# Patient Record
Sex: MALE | Race: WHITE | NOT HISPANIC OR LATINO | Employment: FULL TIME | URBAN - METROPOLITAN AREA
[De-identification: names, ages, dates, MRNs, and addresses within clinical notes are randomized per-mention and may not be internally consistent; named-entity substitution may affect disease eponyms.]

---

## 2021-03-31 ENCOUNTER — NURSE TRIAGE (OUTPATIENT)
Dept: OTHER | Facility: OTHER | Age: 51
End: 2021-03-31

## 2021-03-31 DIAGNOSIS — Z20.822 SUSPECTED SEVERE ACUTE RESPIRATORY SYNDROME CORONAVIRUS 2 (SARS-COV-2) INFECTION: ICD-10-CM

## 2021-03-31 DIAGNOSIS — Z20.822 SUSPECTED SEVERE ACUTE RESPIRATORY SYNDROME CORONAVIRUS 2 (SARS-COV-2) INFECTION: Primary | ICD-10-CM

## 2021-03-31 PROCEDURE — U0005 INFEC AGEN DETEC AMPLI PROBE: HCPCS | Performed by: FAMILY MEDICINE

## 2021-03-31 PROCEDURE — U0003 INFECTIOUS AGENT DETECTION BY NUCLEIC ACID (DNA OR RNA); SEVERE ACUTE RESPIRATORY SYNDROME CORONAVIRUS 2 (SARS-COV-2) (CORONAVIRUS DISEASE [COVID-19]), AMPLIFIED PROBE TECHNIQUE, MAKING USE OF HIGH THROUGHPUT TECHNOLOGIES AS DESCRIBED BY CMS-2020-01-R: HCPCS | Performed by: FAMILY MEDICINE

## 2021-03-31 NOTE — TELEPHONE ENCOUNTER
1  Were you within 6 feet or less, for up to 15 minutes or more with a person that has a confirmed COVID-19 test? Blaire Mclean tested positive 3/28/2021   2  What was the date of your exposure? Works closely with him everyday  Last date of close contact was 3/26/2021    3  Are you experiencing any symptoms attributed to the virus?  (Assess for SOB, cough, fever, difficulty breathing)   Started 3/30/2021  Runny nose  Watery eyes  Intermittent, productive cough  Sputum is clear  He was coughing a lot earlier this morning  Body aches  4   HIGH RISK: Do you have any history heart or lung conditions, weakened immune system, diabetes, Asthma, CHF, HIV, COPD, Chemo, renal failure, sickle cell, etc?    Smoker

## 2021-03-31 NOTE — TELEPHONE ENCOUNTER
Reason for Disposition   [1] COVID-19 infection suspected by caller or triager AND [2] mild symptoms (cough, fever, or others) AND [0] no complications or SOB    Protocols used: CORONAVIRUS (COVID-19) DIAGNOSED OR SUSPECTED-ADULT-OH

## 2021-03-31 NOTE — TELEPHONE ENCOUNTER
Regarding: SYMPTOMATIC - RUNNY NOSE/COUGH - COVID TEST REQUEST  ----- Message from Rashel Gracia sent at 3/31/2021 10:17 AM EDT -----  "I need to be tested due to my boss has tested positive and now I am now symptoms runny nose, cough, eyes watery  "

## 2021-04-01 LAB — SARS-COV-2 RNA RESP QL NAA+PROBE: NEGATIVE

## 2022-12-14 ENCOUNTER — OFFICE VISIT (OUTPATIENT)
Dept: FAMILY MEDICINE CLINIC | Facility: CLINIC | Age: 52
End: 2022-12-14

## 2022-12-14 VITALS
WEIGHT: 188 LBS | HEIGHT: 68 IN | RESPIRATION RATE: 16 BRPM | DIASTOLIC BLOOD PRESSURE: 80 MMHG | TEMPERATURE: 97.1 F | OXYGEN SATURATION: 99 % | SYSTOLIC BLOOD PRESSURE: 120 MMHG | BODY MASS INDEX: 28.49 KG/M2 | HEART RATE: 63 BPM

## 2022-12-14 DIAGNOSIS — F41.9 ANXIETY: ICD-10-CM

## 2022-12-14 DIAGNOSIS — F33.41 RECURRENT MAJOR DEPRESSIVE DISORDER, IN PARTIAL REMISSION (HCC): ICD-10-CM

## 2022-12-14 DIAGNOSIS — F10.20 UNCOMPLICATED ALCOHOL DEPENDENCE (HCC): Primary | ICD-10-CM

## 2022-12-14 DIAGNOSIS — F17.200 NICOTINE USE DISORDER: ICD-10-CM

## 2022-12-14 RX ORDER — BUSPIRONE HYDROCHLORIDE 15 MG/1
15 TABLET ORAL 2 TIMES DAILY
COMMUNITY
End: 2022-12-14 | Stop reason: SDUPTHER

## 2022-12-14 RX ORDER — NALTREXONE 380 MG
KIT INTRAMUSCULAR
COMMUNITY
Start: 2022-12-12

## 2022-12-14 RX ORDER — PHENOL 1.4 %
AEROSOL, SPRAY (ML) MUCOUS MEMBRANE
COMMUNITY

## 2022-12-14 RX ORDER — BUSPIRONE HYDROCHLORIDE 15 MG/1
15 TABLET ORAL 2 TIMES DAILY
Qty: 180 TABLET | Refills: 3 | Status: SHIPPED | OUTPATIENT
Start: 2022-12-14 | End: 2022-12-22 | Stop reason: SDUPTHER

## 2022-12-14 RX ORDER — ESCITALOPRAM OXALATE 10 MG/1
10 TABLET ORAL DAILY
COMMUNITY
End: 2022-12-14 | Stop reason: SDUPTHER

## 2022-12-14 RX ORDER — ESCITALOPRAM OXALATE 10 MG/1
10 TABLET ORAL DAILY
Qty: 90 TABLET | Refills: 3 | Status: SHIPPED | OUTPATIENT
Start: 2022-12-14 | End: 2022-12-22 | Stop reason: SDUPTHER

## 2022-12-14 RX ORDER — TRAZODONE HYDROCHLORIDE 50 MG/1
TABLET ORAL
Qty: 60 TABLET | Refills: 5 | Status: SHIPPED | OUTPATIENT
Start: 2022-12-14 | End: 2022-12-22 | Stop reason: SDUPTHER

## 2022-12-14 NOTE — PROGRESS NOTES
Chief Complaint   Patient presents with   • Establish Care     Recent rehab        HPI   29-year-old male presents as new patient  Has history significant for alcoholism  Just completed a 3-week rehab at [facility]  Has been in rehab in 2015 but was not quite paying attention  90-day program at that time  Lamar Barahonauel His alcohol of choice  He was discharged on Vivitrol injections  Also treated for anxiety/depression with Lexapro 10 mg daily and buspirone 15 mg twice daily  Past history occludes tobacco use about 1 pack a day  History is negative for hypertension, heart disease, diabetes, stroke, and cancer  No allergies to medications  Notes that he has difficulty sleeping at night  Mind is off to the races  Also has difficulty with focus  Has been using nicotine lozenges which seems to help for the craving for tobacco   Currently smoking 1 or 2 cigarettes a day and only taking a couple puffs  First rehab was prompted by [facility] multiple times in the prospect of going to group home  Current rehab precipitated by drinking on the job and losing his job and his girlfriend having enough     Going to NA meetings  Gets more out of them than AA meetings  Trying to go daily  Will be going to [address] for outpatient follow-up  Should be getting his Vivitrol through hand  Discharged from rehab 5 days ago  Past Medical History:   Diagnosis Date   • Alcohol addiction ([facility] ) 12/14/2015   • Anxiety 12/14/2022   • Nicotine use disorder 12/14/2022   • Recurrent major depressive disorder, in partial remission ([facility] ) 12/14/2022        History reviewed  No pertinent surgical history  Social History     Tobacco Use   • Smoking status: Every Day     Types: Cigarettes   • Smokeless tobacco: Never   Substance Use Topics   • Alcohol use: Not Currently       Social History     Social History Narrative    With Patti since 2003  5-year marriage previously  2 children from first marriage  Is a       Patti is a nurse at [place] [address]  Enjoys boating  The following portions of the patient's history were reviewed and updated as appropriate: allergies, current medications, past family history, past medical history, past social history, past surgical history and problem list       Review of Systems       /80   Pulse 63   Temp (!) 97 1 °F (36 2 °C) (Temporal)   Resp 16   Ht 5' 8 11" (1 73 m)   Wt 85 3 kg (188 lb)   SpO2 99%   BMI 28 49 kg/m²      Physical Exam   Pleasant male who appears well  Repeat blood pressure 144/90  Lungs are clear  Heart regular  Mood appears to be okay  Affect appropriate  Current Outpatient Medications:   •  Melatonin 10 MG TABS, Take by mouth daily at bedtime, Disp: , Rfl:   •  Vivitrol 380 MG SUSR, , Disp: , Rfl:   •  busPIRone (BUSPAR) 15 mg tablet, Take 1 tablet (15 mg total) by mouth 2 (two) times a day, Disp: 180 tablet, Rfl: 3  •  escitalopram (LEXAPRO) 10 mg tablet, Take 1 tablet (10 mg total) by mouth daily, Disp: 90 tablet, Rfl: 3  •  nicotine polacrilex (NICORETTE) 4 mg gum, Chew 1 each (4 mg total) as needed for smoking cessation, Disp: 100 each, Rfl: 5  •  traZODone (DESYREL) 50 mg tablet, One-2 tablets at bedtime as needed for sleep, Disp: 60 tablet, Rfl: 5     No problem-specific Assessment & Plan notes found for this encounter  Diagnoses and all orders for this visit:    Uncomplicated alcohol dependence ([facility] )    Anxiety  -     busPIRone (BUSPAR) 15 mg tablet; Take 1 tablet (15 mg total) by mouth 2 (two) times a day    Recurrent major depressive disorder, in partial remission (HCC)  -     escitalopram (LEXAPRO) 10 mg tablet; Take 1 tablet (10 mg total) by mouth daily  -     traZODone (DESYREL) 50 mg tablet; One-2 tablets at bedtime as needed for sleep    Nicotine use disorder  -     nicotine polacrilex (NICORETTE) 4 mg gum;  Chew 1 each (4 mg total) as needed for smoking cessation    Other orders  -     Vivitrol 380 MG SUSR  - Discontinue: escitalopram (LEXAPRO) 10 mg tablet; Take 10 mg by mouth daily  -     Discontinue: busPIRone (BUSPAR) 15 mg tablet; Take 15 mg by mouth 2 (two) times a day  -     Melatonin 10 MG TABS; Take by mouth daily at bedtime  -     Discontinue: nicotine polacrilex (NICORETTE) 4 mg gum; Chew 4 mg as needed for smoking cessation        Patient Instructions   Review of his extensive addictive history and doing great so far-every for 5 days  Continue Lexapro which is used for anxiety and depression and buspirone 15 mg twice daily for anxiety  Will be getting Vivitrol through the outpatient alcohol clinic  Switch to Nicorette gum as he appears to be doing great is cutting back on his use of tobacco   Trial of trazodone 50 mg, 1 or 2 tablets at bedtime to help with sleep  Will consider treating his difficulty with focus and follow-up visit  Also completed physical exam at that time and get some blood work to check liver function test and cholesterol  Recheck in 1 month

## 2022-12-14 NOTE — PATIENT INSTRUCTIONS
Review of his extensive addictive history and doing great so far-every for 5 days  Continue Lexapro which is used for anxiety and depression and buspirone 15 mg twice daily for anxiety  Will be getting Vivitrol through the outpatient alcohol clinic  Switch to Nicorette gum as he appears to be doing great is cutting back on his use of tobacco   Trial of trazodone 50 mg, 1 or 2 tablets at bedtime to help with sleep  Will consider treating his difficulty with focus and follow-up visit  Also completed physical exam at that time and get some blood work to check liver function test and cholesterol  Recheck in 1 month

## 2022-12-21 ENCOUNTER — TELEPHONE (OUTPATIENT)
Dept: FAMILY MEDICINE CLINIC | Facility: CLINIC | Age: 52
End: 2022-12-21

## 2022-12-21 DIAGNOSIS — F17.200 NICOTINE USE DISORDER: ICD-10-CM

## 2022-12-21 DIAGNOSIS — F33.41 RECURRENT MAJOR DEPRESSIVE DISORDER, IN PARTIAL REMISSION (HCC): ICD-10-CM

## 2022-12-21 DIAGNOSIS — F41.9 ANXIETY: ICD-10-CM

## 2022-12-21 NOTE — TELEPHONE ENCOUNTER
Patient called and stated that he does not have his insurance card yet and he is using [facility] and his medications are all cheaper at [employer] in [place]  Can you please resend all of the ones you prescribed to [facility] aid?   I already cancelled them through [facility]   Please call patient to advise

## 2022-12-22 RX ORDER — BUSPIRONE HYDROCHLORIDE 15 MG/1
15 TABLET ORAL 2 TIMES DAILY
Qty: 180 TABLET | Refills: 3 | Status: SHIPPED | OUTPATIENT
Start: 2022-12-22

## 2022-12-22 RX ORDER — ESCITALOPRAM OXALATE 10 MG/1
10 TABLET ORAL DAILY
Qty: 90 TABLET | Refills: 3 | Status: SHIPPED | OUTPATIENT
Start: 2022-12-22

## 2022-12-22 RX ORDER — TRAZODONE HYDROCHLORIDE 50 MG/1
TABLET ORAL
Qty: 60 TABLET | Refills: 5 | Status: SHIPPED | OUTPATIENT
Start: 2022-12-22

## 2023-04-04 ENCOUNTER — OFFICE VISIT (OUTPATIENT)
Dept: FAMILY MEDICINE CLINIC | Facility: CLINIC | Age: 53
End: 2023-04-04

## 2023-04-04 ENCOUNTER — HOSPITAL ENCOUNTER (INPATIENT)
Facility: HOSPITAL | Age: 53
LOS: 2 days | Discharge: HOME | End: 2023-04-06
Attending: EMERGENCY MEDICINE | Admitting: EMERGENCY MEDICINE

## 2023-04-04 VITALS
HEART RATE: 121 BPM | BODY MASS INDEX: 29.86 KG/M2 | OXYGEN SATURATION: 97 % | SYSTOLIC BLOOD PRESSURE: 141 MMHG | DIASTOLIC BLOOD PRESSURE: 76 MMHG | HEIGHT: 68 IN | RESPIRATION RATE: 16 BRPM | TEMPERATURE: 97.3 F | WEIGHT: 197 LBS

## 2023-04-04 DIAGNOSIS — F10.20 UNCOMPLICATED ALCOHOL DEPENDENCE (HCC): Primary | ICD-10-CM

## 2023-04-04 DIAGNOSIS — F33.41 RECURRENT MAJOR DEPRESSIVE DISORDER, IN PARTIAL REMISSION (HCC): ICD-10-CM

## 2023-04-04 DIAGNOSIS — F41.9 ANXIETY: ICD-10-CM

## 2023-04-04 DIAGNOSIS — F10.20 ALCOHOL USE DISORDER, SEVERE, DEPENDENCE (HCC): ICD-10-CM

## 2023-04-04 DIAGNOSIS — F10.10 ALCOHOL ABUSE: Primary | ICD-10-CM

## 2023-04-04 DIAGNOSIS — Z87.898 H/O WHEEZING: ICD-10-CM

## 2023-04-04 PROBLEM — F10.939 ALCOHOL WITHDRAWAL SYNDROME WITH COMPLICATION, WITH UNSPECIFIED COMPLICATION (HCC): Status: ACTIVE | Noted: 2023-04-04

## 2023-04-04 PROBLEM — R79.89 ELEVATED LFTS: Status: ACTIVE | Noted: 2023-04-04

## 2023-04-04 PROBLEM — R74.8 ELEVATED LIPASE: Status: ACTIVE | Noted: 2023-04-04

## 2023-04-04 PROBLEM — R22.2 NODULE OF CHEST WALL: Status: ACTIVE | Noted: 2023-04-04

## 2023-04-04 LAB
ALBUMIN SERPL BCP-MCNC: 4.8 G/DL (ref 3.5–5)
ALP SERPL-CCNC: 79 U/L (ref 34–104)
ALT SERPL W P-5'-P-CCNC: 45 U/L (ref 7–52)
AMPHETAMINES SERPL QL SCN: NEGATIVE
ANION GAP SERPL CALCULATED.3IONS-SCNC: 18 MMOL/L (ref 4–13)
AST SERPL W P-5'-P-CCNC: 122 U/L (ref 13–39)
ATRIAL RATE: 111 BPM
BARBITURATES UR QL: NEGATIVE
BASOPHILS # BLD AUTO: 0.07 THOUSANDS/ÂΜL (ref 0–0.1)
BASOPHILS NFR BLD AUTO: 1 % (ref 0–1)
BENZODIAZ UR QL: NEGATIVE
BILIRUB SERPL-MCNC: 0.67 MG/DL (ref 0.2–1)
BUN SERPL-MCNC: 13 MG/DL (ref 5–25)
CALCIUM SERPL-MCNC: 9.7 MG/DL (ref 8.4–10.2)
CHLORIDE SERPL-SCNC: 101 MMOL/L (ref 96–108)
CO2 SERPL-SCNC: 23 MMOL/L (ref 21–32)
COCAINE UR QL: NEGATIVE
CREAT SERPL-MCNC: 0.83 MG/DL (ref 0.6–1.3)
EOSINOPHIL # BLD AUTO: 0.07 THOUSAND/ÂΜL (ref 0–0.61)
EOSINOPHIL NFR BLD AUTO: 1 % (ref 0–6)
ERYTHROCYTE [DISTWIDTH] IN BLOOD BY AUTOMATED COUNT: 14 % (ref 11.6–15.1)
ETHANOL EXG-MCNC: 0.24 MG/DL
GFR SERPL CREATININE-BSD FRML MDRD: 100 ML/MIN/1.73SQ M
GLUCOSE SERPL-MCNC: 116 MG/DL (ref 65–140)
HCT VFR BLD AUTO: 43.7 % (ref 36.5–49.3)
HGB BLD-MCNC: 15.2 G/DL (ref 12–17)
IMM GRANULOCYTES # BLD AUTO: 0.03 THOUSAND/UL (ref 0–0.2)
IMM GRANULOCYTES NFR BLD AUTO: 0 % (ref 0–2)
LIPASE SERPL-CCNC: 95 U/L (ref 11–82)
LYMPHOCYTES # BLD AUTO: 2.17 THOUSANDS/ÂΜL (ref 0.6–4.47)
LYMPHOCYTES NFR BLD AUTO: 26 % (ref 14–44)
MAGNESIUM SERPL-MCNC: 2.1 MG/DL (ref 1.9–2.7)
MCH RBC QN AUTO: 33.2 PG (ref 26.8–34.3)
MCHC RBC AUTO-ENTMCNC: 34.8 G/DL (ref 31.4–37.4)
MCV RBC AUTO: 95 FL (ref 82–98)
METHADONE UR QL: NEGATIVE
MONOCYTES # BLD AUTO: 1.09 THOUSAND/ÂΜL (ref 0.17–1.22)
MONOCYTES NFR BLD AUTO: 13 % (ref 4–12)
NEUTROPHILS # BLD AUTO: 4.8 THOUSANDS/ÂΜL (ref 1.85–7.62)
NEUTS SEG NFR BLD AUTO: 59 % (ref 43–75)
NRBC BLD AUTO-RTO: 0 /100 WBCS
OPIATES UR QL SCN: NEGATIVE
OXYCODONE+OXYMORPHONE UR QL SCN: NEGATIVE
P AXIS: 38 DEGREES
PCP UR QL: NEGATIVE
PLATELET # BLD AUTO: 219 THOUSANDS/UL (ref 149–390)
PMV BLD AUTO: 8.7 FL (ref 8.9–12.7)
POTASSIUM SERPL-SCNC: 3.5 MMOL/L (ref 3.5–5.3)
PR INTERVAL: 140 MS
PROT SERPL-MCNC: 7 G/DL (ref 6.4–8.4)
QRS AXIS: 23 DEGREES
QRSD INTERVAL: 78 MS
QT INTERVAL: 314 MS
QTC INTERVAL: 427 MS
RBC # BLD AUTO: 4.58 MILLION/UL (ref 3.88–5.62)
SODIUM SERPL-SCNC: 142 MMOL/L (ref 135–147)
T WAVE AXIS: 45 DEGREES
THC UR QL: NEGATIVE
VENTRICULAR RATE: 111 BPM
WBC # BLD AUTO: 8.23 THOUSAND/UL (ref 4.31–10.16)

## 2023-04-04 RX ORDER — PHENOBARBITAL SODIUM 130 MG/ML
130 INJECTION INTRAMUSCULAR ONCE
Status: COMPLETED | OUTPATIENT
Start: 2023-04-04 | End: 2023-04-04

## 2023-04-04 RX ORDER — ALBUTEROL SULFATE 90 UG/1
2 AEROSOL, METERED RESPIRATORY (INHALATION) EVERY 4 HOURS PRN
Status: DISCONTINUED | OUTPATIENT
Start: 2023-04-04 | End: 2023-04-07 | Stop reason: HOSPADM

## 2023-04-04 RX ORDER — TRAZODONE HYDROCHLORIDE 50 MG/1
TABLET ORAL
Qty: 60 TABLET | Refills: 5 | Status: ON HOLD | OUTPATIENT
Start: 2023-04-04 | End: 2023-04-06 | Stop reason: SDUPTHER

## 2023-04-04 RX ORDER — NICOTINE 21 MG/24HR
21 PATCH, TRANSDERMAL 24 HOURS TRANSDERMAL DAILY
Status: DISCONTINUED | OUTPATIENT
Start: 2023-04-05 | End: 2023-04-04

## 2023-04-04 RX ORDER — ACETAMINOPHEN 325 MG/1
650 TABLET ORAL EVERY 6 HOURS PRN
Status: DISCONTINUED | OUTPATIENT
Start: 2023-04-04 | End: 2023-04-07 | Stop reason: HOSPADM

## 2023-04-04 RX ORDER — NICOTINE 21 MG/24HR
14 PATCH, TRANSDERMAL 24 HOURS TRANSDERMAL DAILY
Status: DISCONTINUED | OUTPATIENT
Start: 2023-04-05 | End: 2023-04-07 | Stop reason: HOSPADM

## 2023-04-04 RX ORDER — ONDANSETRON 2 MG/ML
4 INJECTION INTRAMUSCULAR; INTRAVENOUS EVERY 6 HOURS PRN
Status: DISCONTINUED | OUTPATIENT
Start: 2023-04-04 | End: 2023-04-07 | Stop reason: HOSPADM

## 2023-04-04 RX ORDER — ESCITALOPRAM OXALATE 10 MG/1
10 TABLET ORAL DAILY
Status: DISCONTINUED | OUTPATIENT
Start: 2023-04-05 | End: 2023-04-07 | Stop reason: HOSPADM

## 2023-04-04 RX ORDER — LORAZEPAM 1 MG/1
TABLET ORAL
Qty: 60 TABLET | Refills: 1 | Status: SHIPPED | OUTPATIENT
Start: 2023-04-04 | End: 2023-04-06

## 2023-04-04 RX ORDER — DIAZEPAM 5 MG/ML
10 INJECTION, SOLUTION INTRAMUSCULAR; INTRAVENOUS ONCE
Status: COMPLETED | OUTPATIENT
Start: 2023-04-04 | End: 2023-04-04

## 2023-04-04 RX ORDER — LORAZEPAM 2 MG/ML
2 INJECTION INTRAMUSCULAR ONCE
Status: COMPLETED | OUTPATIENT
Start: 2023-04-04 | End: 2023-04-04

## 2023-04-04 RX ORDER — SODIUM CHLORIDE 9 MG/ML
100 INJECTION, SOLUTION INTRAVENOUS CONTINUOUS
Status: DISCONTINUED | OUTPATIENT
Start: 2023-04-04 | End: 2023-04-06

## 2023-04-04 RX ORDER — ENOXAPARIN SODIUM 100 MG/ML
40 INJECTION SUBCUTANEOUS DAILY
Status: DISCONTINUED | OUTPATIENT
Start: 2023-04-05 | End: 2023-04-07 | Stop reason: HOSPADM

## 2023-04-04 RX ORDER — TRAZODONE HYDROCHLORIDE 100 MG/1
100 TABLET ORAL
Status: DISCONTINUED | OUTPATIENT
Start: 2023-04-04 | End: 2023-04-07 | Stop reason: HOSPADM

## 2023-04-04 RX ORDER — PHENOBARBITAL SODIUM 130 MG/ML
260 INJECTION INTRAMUSCULAR ONCE
Status: COMPLETED | OUTPATIENT
Start: 2023-04-04 | End: 2023-04-04

## 2023-04-04 RX ORDER — ESCITALOPRAM OXALATE 10 MG/1
10 TABLET ORAL DAILY
Qty: 30 TABLET | Refills: 3 | Status: ON HOLD | OUTPATIENT
Start: 2023-04-04 | End: 2023-04-06 | Stop reason: SDUPTHER

## 2023-04-04 RX ORDER — NALTREXONE HYDROCHLORIDE 50 MG/1
TABLET, FILM COATED ORAL
Qty: 30 TABLET | Refills: 5 | Status: SHIPPED | OUTPATIENT
Start: 2023-04-04 | End: 2023-04-06

## 2023-04-04 RX ADMIN — PHENOBARBITAL SODIUM 130 MG: 130 INJECTION INTRAMUSCULAR at 20:00

## 2023-04-04 RX ADMIN — LORAZEPAM 2 MG: 2 INJECTION INTRAMUSCULAR; INTRAVENOUS at 16:16

## 2023-04-04 RX ADMIN — NICOTINE POLACRILEX 2 MG: 2 GUM, CHEWING BUCCAL at 19:41

## 2023-04-04 RX ADMIN — SODIUM CHLORIDE 100 ML/HR: 0.9 INJECTION, SOLUTION INTRAVENOUS at 17:53

## 2023-04-04 RX ADMIN — DIAZEPAM 10 MG: 5 INJECTION INTRAMUSCULAR; INTRAVENOUS at 18:25

## 2023-04-04 RX ADMIN — PHENOBARBITAL SODIUM 260 MG: 130 INJECTION INTRAMUSCULAR at 18:25

## 2023-04-04 NOTE — PATIENT INSTRUCTIONS
Probably would benefit most from going to detox at [facility]   He is not quite ready to do that so we will give him lorazepam 1 mg to use 1 or 2 tablets 3 times a day for symptoms of withdrawal   Begin ReVia 50 mg to prevent alcohol cravings  Continue Lexapro 10 mg daily and trazodone 1 or 2 tablets at bedtime to help with sleep  Suggest going to [place] [address] meetings once or twice a day  Follow-up in 1 week

## 2023-04-04 NOTE — ASSESSMENT & PLAN NOTE
"· Patient states \"I think I may have a hernia\"  · Notes he has had a \"lump\" on chest wall for a few yrs; denies acute symptoms (non-tender)  · On exam: non-erythematous quarter-sized nodule palpated inferior to xiphoid process, non-tender  · No hernia noted on abdominal exam   · Unclear etiology- possibly lipoma  · Recommend outpatient f/u PCP  "

## 2023-04-04 NOTE — ASSESSMENT & PLAN NOTE
· Current every day smoker   · Reports h/o wheezing with exertion  · Notes he thinks he may have COPD but has never been formally diagnosed  · Currently denies acute symptoms- denies SOB, chest pain   · Lungs CTA b/l, no WRRs; lungs sound mildly diminished at b/l bases  · Will order albuterol PRN  · Encourage smoking cessation  · Recommend outpatient f/u PCP, pulmonology for further work-up

## 2023-04-04 NOTE — ASSESSMENT & PLAN NOTE
· Lipase mildly elevated on admission  · In setting of chronic alcohol use  · No acute abdominal pain on exam; tolerating PO intake   · Will recheck in AM  · Consider abdominal imaging if acute symptoms develop   · Encourage alcohol cessation

## 2023-04-04 NOTE — ASSESSMENT & PLAN NOTE
· H/o chronic alcohol use; unsure of h/o withdrawal seizures (never formally diagnosed)  · Last drink: 4/4/23   · BAT: 0 242   · Received 2 mg lorazepam IV in ED PTA   · Follow SEWS protocol with symptom-triggered phenobarbital for medically-assisted alcohol withdrawal  · Current symptoms include: tachycardia, HTN, tremors, anxiety  · Administer 260 mg phenobarbital  · Continue to monitor vitals, symptoms and administer additional phenobarbital as indicated   · Continuous pulse oximetry and telemetry monitoring

## 2023-04-04 NOTE — ED PROVIDER NOTES
History  Chief Complaint   Patient presents with   • Detox Evaluation     Patient is a 50-year-old male who presents at the request of his PCP for detox  States has been drinking a bottle of vodka a day for several years  Tried twice in the past to stop  Lasted only a few days in 2022  No h/o withdrawal seizure  Saw PCP today, referred in  Scripts written, but nothing taken yet  Just drank PTA  Denies any SI  States under a lot of stress, girlfriend of 20 years left  Prior to Admission Medications   Prescriptions Last Dose Informant Patient Reported? Taking? LORazepam (ATIVAN) 1 mg tablet   No No   Si-2 tablet 3 times a day for withdrawal   Vivitrol 380 MG SUSR   Yes No   busPIRone (BUSPAR) 15 mg tablet   No No   Sig: Take 1 tablet (15 mg total) by mouth 2 (two) times a day   escitalopram (LEXAPRO) 10 mg tablet   No No   Sig: Take 1 tablet (10 mg total) by mouth daily   naltrexone (REVIA) 50 mg tablet   No No   Si tablet daily to reduce alcohol cravings   nicotine polacrilex (NICORETTE) 4 mg gum   No No   Sig: Chew 1 each (4 mg total) as needed for smoking cessation   traZODone (DESYREL) 50 mg tablet   No No   Sig: One-2 tablets at bedtime as needed for sleep      Facility-Administered Medications: None       Past Medical History:   Diagnosis Date   • Alcohol addiction ([facility] ) 2015   • Anxiety 2022   • Nicotine use disorder 2022   • Recurrent major depressive disorder, in partial remission ([facility] ) 2022       No past surgical history on file  No family history on file  I have reviewed and agree with the history as documented      E-Cigarette/Vaping   • E-Cigarette Use Never User      E-Cigarette/Vaping Substances   • Nicotine No    • THC No    • CBD No    • Flavoring No    • Other No    • Unknown No      Social History     Tobacco Use   • Smoking status: Every Day     Types: Cigarettes   • Smokeless tobacco: Never   Vaping Use   • Vaping Use: Never used Substance Use Topics   • Alcohol use: Not Currently   • Drug use: Not Currently       Review of Systems   Constitutional: Negative  HENT: Negative  Eyes: Negative  Respiratory: Negative  Cardiovascular: Negative  Gastrointestinal: Negative  Endocrine: Negative  Genitourinary: Negative  Musculoskeletal: Negative  Skin: Negative  Allergic/Immunologic: Negative  Neurological: Negative  Hematological: Negative  Psychiatric/Behavioral: Negative  All other systems reviewed and are negative  Physical Exam  Physical Exam  Vitals and nursing note reviewed  Constitutional:       Appearance: Normal appearance  He is normal weight  HENT:      Head: Normocephalic and atraumatic  Nose: Nose normal       Mouth/Throat:      Mouth: Mucous membranes are moist    Eyes:      Pupils: Pupils are equal, round, and reactive to light  Cardiovascular:      Rate and Rhythm: Regular rhythm  Tachycardia present  Pulses: Normal pulses  Heart sounds: Normal heart sounds  Pulmonary:      Effort: Pulmonary effort is normal       Breath sounds: Normal breath sounds  Abdominal:      General: Bowel sounds are normal       Palpations: Abdomen is soft  Musculoskeletal:         General: Normal range of motion  Cervical back: Normal range of motion and neck supple  Skin:     General: Skin is warm and dry  Capillary Refill: Capillary refill takes less than 2 seconds  Neurological:      General: No focal deficit present  Mental Status: He is alert and oriented to person, place, and time     Psychiatric:         Mood and Affect: Mood normal          Behavior: Behavior normal          Vital Signs  ED Triage Vitals [04/04/23 1544]   Temperature Pulse Respirations Blood Pressure SpO2   (!) 97 1 °F (36 2 °C) (!) 141 18 (!) 146/110 98 %      Temp src Heart Rate Source Patient Position - Orthostatic VS BP Location FiO2 (%)   -- Monitor -- -- --      Pain Score       -- Vitals:    04/04/23 1544 04/04/23 1557   BP: (!) 146/110    Pulse: (!) 141 (!) 114         Visual Acuity      ED Medications  Medications   LORazepam (ATIVAN) injection 2 mg (has no administration in time range)       Diagnostic Studies  Results Reviewed     Procedure Component Value Units Date/Time    CBC and differential [[id number]]     Lab Status: No result Specimen: Blood     Comprehensive metabolic panel [[id number]]     Lab Status: No result Specimen: Blood     Lipase [[id number]]     Lab Status: No result Specimen: Blood     Magnesium [[id number]]     Lab Status: No result Specimen: Blood     POCT alcohol breath test [[id number]]     Lab Status: No result     Rapid drug screen, urine [[id number]]     Lab Status: No result Specimen: Urine                  No orders to display              Procedures  ECG 12 Lead Documentation Only    Date/Time: 4/4/2023 3:55 PM  Performed by: August Causey MD  Authorized by: August Causey MD     Indications / Diagnosis:  Detox  ECG reviewed by me, the ED Provider: yes    Patient location:  ED  Interpretation:     Interpretation: normal    Rate:     ECG rate:  111    ECG rate assessment: tachycardic    Rhythm:     Rhythm: sinus tachycardia    Ectopy:     Ectopy: none    QRS:     QRS axis:  Normal    QRS intervals:  Normal  Conduction:     Conduction: normal    ST segments:     ST segments:  Normal  T waves:     T waves: normal               ED Course                                             MDM    Disposition  Final diagnoses:   None     ED Disposition     None      Follow-up Information    None         Patient's Medications   Discharge Prescriptions    No medications on file       No discharge procedures on file      PDMP Review       Value Time User    PDMP Reviewed  Yes 4/4/2023  1:47 PM Elvis Sutherland MD          ED Provider  Electronically Signed by           August Causey MD  04/04/23 4619

## 2023-04-04 NOTE — ASSESSMENT & PLAN NOTE
Recent Labs     04/04/23  1609   *   ALT 45   ALKPHOS 79   TBILI 0 67      · In setting of chronic alcohol use  · No acute abdominal pain on exam   · Continue to trend LFTs  · Encourage alcohol cessation

## 2023-04-04 NOTE — ASSESSMENT & PLAN NOTE
· Pt with a h/o chronic heavy alcohol since age 12   · Previous h/o inpatient rehab, most recently [facility] x 28 days Nov - early Dec  2022  · Reports he was sober for about 4 weeks until early January when he relapsed   · Has been drinking fifth to half gallon of vodka daily since relapse; notes he wakes up in the middle of the night to drink  · Previously on Naltrexone and Vivitrol - reports did not help   · Withdrawal management as above  · Initiate IVFs, daily thiamine/folic acid supplementation, and MVI  · Consult case management for assistance with aftercare resources - pt interested in outpatient resources with [id number] at this time

## 2023-04-04 NOTE — ASSESSMENT & PLAN NOTE
· Patient endorses a h/o ongoing depression  · Current Home medications buspar 15 mg BID, lexapro 10 mg daily, Trazodone 100mg HS PRN  · Reports that he was taking Buspar 15 mg daily as he was only able to afford 30 tabs instead of 60 tabs  · Denies SI/HI/thoughts of self harm  · Continue home medication regimen  · Recommend OP f/u with PCP/OP Psychiatry

## 2023-04-04 NOTE — PROGRESS NOTES
Chief Complaint   Patient presents with   • [place] Colonscopy     Vs Cologuard- discuss with pt    • Follow-up     Alcoholism-relapsed  Unemployed today but may job on [employer]  HPI   Here because his alcohol is killing him  Drinks more than a bottle of vodka a day  Because of his relationship with Patti of 20 years to and about 2 months ago  Feeling very depressed  Has been to rehab twice  The last was in November  [facility]  Was [facility] for almost 4 weeks  When he got out, girlfriend and him were not doing well so he relapsed  Only went to a couple meetings  Was given naltrexone which did not stop his drinking  Only received 1 shot of the Vivitrol  At discharge from rehab, was prescribed Lexapro and trazodone which he uses but not religiously  Also given buspirone for anxiety  Notes a strong family history of alcoholism  All the men in his family  Lost his job in October  Has been living with his cousin since he is no longer with Patti  First drink in the morning is as soon as he wakes up  Past Medical History:   Diagnosis Date   • Alcohol addiction ([facility] ) 12/14/2015   • Anxiety 12/14/2022   • Nicotine use disorder 12/14/2022   • Recurrent major depressive disorder, in partial remission ([facility] ) 12/14/2022        History reviewed  No pertinent surgical history  Social History     Tobacco Use   • Smoking status: Every Day     Types: Cigarettes   • Smokeless tobacco: Never   Substance Use Topics   • Alcohol use: Not Currently       Social History     Social History Narrative    With Patti since 2003  Ended Feb 2023 because of his drinking  5-year marriage previously  2 children from first marriage  Is a   Enjoys boating          The following portions of the patient's history were reviewed and updated as appropriate: allergies, current medications, past family history, past medical history, past social history, past surgical history and problem list       Review of "Systems       /76 (BP Location: Left arm, Patient Position: Sitting, Cuff Size: Large)   Pulse (!) 121   Temp (!) 97 3 °F (36 3 °C) (Temporal)   Resp 16   Ht 5' 8 11\" (1 73 m)   Wt 89 4 kg (197 lb) Comment: with steel tip boots  SpO2 97%   BMI 29 86 kg/m²      Physical Exam   Alert  Somewhat distressed  On the depressed side  Seems to have good judgment  Current Outpatient Medications:   •  busPIRone (BUSPAR) 15 mg tablet, Take 1 tablet (15 mg total) by mouth 2 (two) times a day, Disp: 180 tablet, Rfl: 3  •  escitalopram (LEXAPRO) 10 mg tablet, Take 1 tablet (10 mg total) by mouth daily, Disp: 30 tablet, Rfl: 3  •  LORazepam (ATIVAN) 1 mg tablet, 1-2 tablet 3 times a day for withdrawal, Disp: 60 tablet, Rfl: 1  •  naltrexone (REVIA) 50 mg tablet, 1 tablet daily to reduce alcohol cravings, Disp: 30 tablet, Rfl: 5  •  nicotine polacrilex (NICORETTE) 4 mg gum, Chew 1 each (4 mg total) as needed for smoking cessation, Disp: 100 each, Rfl: 5  •  traZODone (DESYREL) 50 mg tablet, One-2 tablets at bedtime as needed for sleep, Disp: 60 tablet, Rfl: 5  •  Vivitrol 380 MG SUSR, , Disp: , Rfl:      No problem-specific Assessment & Plan notes found for this encounter  Diagnoses and all orders for this visit:    Uncomplicated alcohol dependence ([facility] )  -     LORazepam (ATIVAN) 1 mg tablet; 1-2 tablet 3 times a day for withdrawal  -     naltrexone (REVIA) 50 mg tablet; 1 tablet daily to reduce alcohol cravings    Recurrent major depressive disorder, in partial remission (HCC)  -     escitalopram (LEXAPRO) 10 mg tablet; Take 1 tablet (10 mg total) by mouth daily  -     traZODone (DESYREL) 50 mg tablet;  One-2 tablets at bedtime as needed for sleep        Patient Instructions   Probably would benefit most from going to detox at [facility]   He is not quite ready to do that so we will give him lorazepam 1 mg to use 1 or 2 tablets 3 times a day for symptoms of withdrawal   Begin ReVia 50 mg " to prevent alcohol cravings  Continue Lexapro 10 mg daily and trazodone 1 or 2 tablets at bedtime to help with sleep  Suggest going to [place] [address] meetings once or twice a day  Follow-up in 1 week

## 2023-04-04 NOTE — H&P
[facility]  H&P  Name: Mary Jo Montes  MRN: [id number]  Unit/Bed#: 5T DETOX 828-79 I Date of Admission: 4/4/2023   Date of Service: 4/4/2023 I Hospital Day: 0    Reason for Admission/Principal Problem: Ethanol withdrawal, Ethanol use disorder  Admitting Provider: Luis Davis PA-C  Attending Provider: Tristin Caceres MD   4/4/2023  3:40 PM    * Alcohol withdrawal syndrome with complication, with unspecified complication ([facility] )  Assessment & Plan  · H/o chronic alcohol use; unsure of h/o withdrawal seizures (never formally diagnosed)  · Last drink: 4/4/23   · BAT: 0 242   · Received 2 mg lorazepam IV in ED PTA   · Follow SEWS protocol with symptom-triggered phenobarbital for medically-assisted alcohol withdrawal  · Current symptoms include: tachycardia, HTN, tremors, anxiety  · Administer 260 mg phenobarbital  · Continue to monitor vitals, symptoms and administer additional phenobarbital as indicated   · Continuous pulse oximetry and telemetry monitoring     Alcohol use disorder, severe, dependence ([facility] )  Assessment & Plan  · Pt with a h/o chronic heavy alcohol since age 12   · Previous h/o inpatient rehab, most recently [facility] x 28 days Nov - early Dec  2022  · Reports he was sober for about 4 weeks until early January when he relapsed   · Has been drinking fifth to half gallon of vodka daily since relapse; notes he wakes up in the middle of the night to drink  · Previously on Naltrexone and Vivitrol - reports did not help   · Withdrawal management as above  · Initiate IVFs, daily thiamine/folic acid supplementation, and MVI  · Consult case management for assistance with aftercare resources - pt interested in outpatient resources with [id number] at this time    Elevated LFTs  Assessment & Plan  Recent Labs     04/04/23  1609   *   ALT 45   ALKPHOS 79   TBILI 0 67      · In setting of chronic alcohol use  · No acute abdominal pain on exam   · Continue to trend "LFTs  · Encourage alcohol cessation     Elevated lipase  Assessment & Plan  · Lipase mildly elevated on admission  · In setting of chronic alcohol use  · No acute abdominal pain on exam; tolerating PO intake   · Will recheck in AM  · Consider abdominal imaging if acute symptoms develop   · Encourage alcohol cessation     H/O wheezing  Assessment & Plan  · Current every day smoker   · Reports h/o wheezing with exertion  · Notes he thinks he may have COPD but has never been formally diagnosed  · Currently denies acute symptoms- denies SOB, chest pain   · Lungs CTA b/l, no WRRs; lungs sound mildly diminished at b/l bases  · Will order albuterol PRN  · Encourage smoking cessation  · Recommend outpatient f/u PCP, pulmonology for further work-up     Nodule of chest wall  Assessment & Plan  · Patient states \"I think I may have a hernia\"  · Notes he has had a \"lump\" on chest wall for a few yrs; denies acute symptoms (non-tender)  · On exam: non-erythematous quarter-sized nodule palpated inferior to xiphoid process, non-tender  · No hernia noted on abdominal exam   · Unclear etiology- possibly lipoma  · Recommend outpatient f/u PCP    Recurrent major depressive disorder, in partial remission ([facility] )  Assessment & Plan  · Patient endorses a h/o ongoing depression  · Current Home medications buspar 15 mg BID, lexapro 10 mg daily, Trazodone 100mg HS PRN  · Reports that he was taking Buspar 15 mg daily as he was only able to afford 30 tabs instead of 60 tabs  · Denies SI/HI/thoughts of self harm  · Continue home medication regimen  · Recommend OP f/u with PCP/OP Psychiatry    Nicotine use disorder  Assessment & Plan  Current every day smoker: 0 5 ppd  Encourage cessation  Offer nicotine replacement      VTE Prophylaxis: Enoxaparin (Lovenox)  / sequential compression device   Code Status: level 1 full code       Anticipated Length of Stay:  Patient will be admitted on an Inpatient basis with an anticipated length of stay of  >2  " midnights  Justification for Hospital Stay: ongoing medical management of alcohol withdrawal     For any questions or concerns, please Tiger Text the advanced practitioner in the role of hospitals-DETOX-AP On Call      This patient qualifies for Level IV medically managed intensive inpatient services under the criteria set by the American Society of Addiction Medicine, including dimensions 1-3  The patient is in withdrawal (or is intoxicated with high risk of withdrawal), with severe and unstable medical and/or psychiatric (dual diagnosis) problems, requiring requires 24-hour medical and nursing care and the full resources of a [address] patient to medical detox unit and continue supportive care and stabilization of acute ethanol withdrawal per medical toxicology/detox treatment pathway  Monitor ethanol withdrawal severity via the Severity of Ethanol Withdrawal Scale (SEWS) Q4 hours and then hourly if/when SEWS > 6  Treat withdrawal per pathway and reassess Q30-60 minutes  Mild SEWS Score 1-6  Administer medications* (IV or PO; PO preferred):  • If initial SEWS score: diazepam 10mg PO/IV x 1 AND phenobarbital 65 mg PO/IV x 1  • If repeat SEWS score 1-6: phenobarbital 65 mg PO/IV q1 hour x 5 doses maximum   Reassessment:   • SEWS q1 hour after each dose until SEWS 0 x 2 hours  • VS q1 hours (until SEWS 0, then q4 hours)  • Notify provider for bedside evaluation if 5-dose maximum is reached, RASS of -3 to -5, or SEWS score escalates to moderate or severe     Moderate SEWS Score 7-12  Administer medications* (IV):  • If initial SEWS score: diazepam 10mg IV x 1 AND phenobarbital 260 mg IV x 1  • If repeat SEWS score 7-12 or score escalated from mild: phenobarbital 130 mg IV q30 minutes x 5 doses maximum   Reassessment:  • SEWS q30 minutes after each dose until SEWS < 7 (then hourly until SEWS 0 x 2 hours)  • VS q30 minutes until SEWS < 7 (then hourly until SEWS 0, then q4 hours)  • Notify provider for bedside evaluation if 5-dose maximum is reached, RASS of -3 to -5, or SEWS score escalates to severe  Severe SEWS Score ? 13  Administer medications* (IV):  • If initial SEWS score: Diazepam 10 mg IV x 1 AND phenobarbital 650 mg IV piggyback x 1 over 15-30 minutes  • If repeat SEWS score ? 13 or score escalated from mild or moderate: phenobarbital 130 mg IV q30 minutes x 5 doses maximum   Reassessment:  • SEWS q30 minutes after each dose until SEWS < 7 (then hourly until SEWS 0 x 2 hours)   • VS q30 minutes until SEWS < 7 (then hourly until SEWS 0, then q4 hours)  • Notify provider for bedside evaluation if 5-dose maximum is reached or RASS of -3 to -5   *Hold medications and notify provider if CNS depression, respirations < 10/min, or RASS of -3 to -5  Medications to be administered adjunctively if more than 2 grams of phenobarbital is needed for stabilization of withdrawal; require attending approval    • Dexmedetomidine infusion 0 1-1mcg/kg/hr IV infusion, titratable to reduced agitation (Goal: RASS -2)  • Ketamine   o Acute agitated delirium: 1-2 mg/kg IV or 4-5 mg/kg IM  o Refractory withdrawal: 0 1-1mg/kg/hr IV infusion, titratable to reduced agitation (Goal: RASS -2)    Further evaluation, screening and treatment:  Evaluate complete metabolic panel, transaminases, INR, and lipase  Assess hepatic ultrasound for any sign of alcoholic liver disease or cirrhosis, and ultimately refer for further hepatic evaluation and care as/if indicated  Additional medications for ethanol associated malnutrition: Thiamine 100 mg IV daily, increase to 500 mg TID for signs/symptoms of Wernicke's Encephalopathy or Wernicke Korsakoff Syndrome   Folic acid 1 mg IV daily   Multivitamin PO daily      Will offer first monthly injection of Naltrexone 380 mg IM, once patient is stabilized, as it has been shown to assist in decreasing cravings for ethanol  "    Evaluate and treat for coexisting substance use, such as opioids and nicotine  Discuss risk factors for infectious disease, such as history of intravenous drug abuse, and offer hepatitis and HIV screening if indicated  Case management consultation to assist with coordination of subsequent treatment after discharge  Hx and PE limited by: n/a    HPI: Geovanni Li is a 47y o  year old male PMH AUD, everyday smoker, anxiety/depression who presents with alcohol withdrawal  Patient was seen by his PCP earlier today and was advised to present to [address] [place] [address] ED to seek treatment  Patient subsequently presented to [facility] ED this afternoon seeking alcohol detoxification  Patient received 2 mg ativan in ED PTA  Patient reports that he did not want to come today, but the nurse at his PCP office and his cousin convinced him to seek inpatient treatment  Patient notes he is very anxious about treatment  Reports he was recently in rehab in Nov 2022 for 28 days, was sober for about 4 weeks until relapse early January 2/2 break-up  Reports that he has been drinking fifth-half gallon of vodka daily, last drink 10 minutes prior to ED presentation  Notes that he often wakes up in the middle of the night to drink  Has never been hospitalized for alcohol withdrawal before  Has tried naltrexone previously but did not find helpful  Likely interested in outpatient resources with [id number] on discharge  PMH and current medications reviewed  Patient reports that he has not seen a doctor in \"several years\", just recently established with PCP in December after getting out of rehab  Reports that he typically takes Buspar 15 mg daily (as opposed to BID as prescribed) and lexapro 10 mg daily; last took both medications yesterday  States he thinks he has COPD but never formally diagnosed       Preferred alcoholic beverage(s): vodka   Quantity and frequency of alcohol intake: fifth- half gallon of vodka daily since end of December " "  Use of any ethanol substitutes (toxic alcohols): no  Date/Time of last alcohol intake: 4/4/2023 around 1430 (just prior to ED presentation)  Current signs and symptoms of ethanol withdrawal: anxiety, tremor, tachycardia and hypertension    SEWS Total Score: 11 (4/4/2023  6:00 PM)    Ethanol Withdrawal History  Previous ethanol withdrawal? yes  Prior inpatient treatment for ethanol withdrawal? yes  Prior outpatient treatment for ethanol withdrawal? yes  History of seizures with prior ethanol withdrawal? Yes- no definitive diagnosis, however patient states he may have had seizure in past   Prior treatment with naltrexone (Vivitrol)? Yes- did not help, \"drank right through it\"  Current treatment with naltrexone (Vivitrol)? no  Other current treatment for ethanol use disorder? no  Co-existing substance use? No- denies current use of marijuana, cocaine, meth, heroin  Denies h/o IVDU    Review of PDMP: yes - no records     Social History     Substance and Sexual Activity   Alcohol Use Not Currently     Social History     Substance and Sexual Activity   Drug Use Not Currently     Social History     Tobacco Use   Smoking Status Every Day   • Types: Cigarettes   Smokeless Tobacco Never       Review of Systems   Constitutional: Positive for appetite change (decreased past several days )  Negative for chills and fever  HENT: Negative for congestion, ear pain, sneezing and sore throat  Eyes: Negative for pain and visual disturbance  Respiratory: Positive for wheezing (occasional, with exertion)  Negative for cough and shortness of breath  Cardiovascular: Negative for chest pain and palpitations  Gastrointestinal: Positive for diarrhea  Negative for abdominal pain, blood in stool, constipation, nausea and vomiting  Genitourinary: Negative for difficulty urinating, frequency and hematuria  Musculoskeletal: Negative for arthralgias and back pain  Skin: Negative for color change and rash     Neurological: " Negative for dizziness, tremors, seizures, syncope, weakness, light-headedness and headaches  Psychiatric/Behavioral: Positive for dysphoric mood  Negative for suicidal ideas  The patient is nervous/anxious  All other systems reviewed and are negative  Historical Information   Past Medical History:   Diagnosis Date   • Alcohol addiction ([facility] ) 12/14/2015   • Anxiety 12/14/2022   • Nicotine use disorder 12/14/2022   • Recurrent major depressive disorder, in partial remission ([facility] ) 12/14/2022     History reviewed  No pertinent surgical history  History reviewed  No pertinent family history  Social History   Marital Status: Single   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  Authorizing Provider   busPIRone (BUSPAR) 15 mg tablet Take 1 tablet (15 mg total) by mouth 2 (two) times a day 12/22/22   Venessa Hall MD   escitalopram (LEXAPRO) 10 mg tablet Take 1 tablet (10 mg total) by mouth daily 4/4/23   Venessa Hall MD   LORazepam (ATIVAN) 1 mg tablet 1-2 tablet 3 times a day for withdrawal 4/4/23   Venessa Hall MD   naltrexone (REVIA) 50 mg tablet 1 tablet daily to reduce alcohol cravings 4/4/23   Venessa Hall MD   nicotine polacrilex (NICORETTE) 4 mg gum Chew 1 each (4 mg total) as needed for smoking cessation 12/22/22   Venessa Hall MD   traZODone (DESYREL) 50 mg tablet One-2 tablets at bedtime as needed for sleep 4/4/23   Venessa Hall MD   Vivitrol 380 MG SUSR  12/12/22   Historical Provider, MD   escitalopram (LEXAPRO) 10 mg tablet Take 1 tablet (10 mg total) by mouth daily 12/22/22 4/4/23  Venessa Hall MD   Melatonin 10 MG TABS Take by mouth daily at bedtime  4/4/23  Historical Provider, MD traZODone (DESYREL) 50 mg tablet One-2 tablets at bedtime as needed for sleep 12/22/22 4/4/23  Constance Lang MD       Current Facility-Administered Medications   Medication Dose Route Frequency   • acetaminophen (TYLENOL) tablet 650 mg  650 mg Oral Q6H PRN   • acetaminophen (TYLENOL) tablet 650 mg  650 mg Oral Q6H PRN   • albuterol (PROVENTIL HFA,VENTOLIN HFA) inhaler 2 puff  2 puff Inhalation Q4H PRN   • [START ON 4/5/2023] enoxaparin (LOVENOX) subcutaneous injection 40 mg  40 mg Subcutaneous Daily   • [START ON 6/0/4643] folic acid 1 mg, thiamine (VITAMIN B1) 100 mg in sodium chloride 0 9 % 100 mL IV piggyback   Intravenous Daily   • [START ON 4/5/2023] nicotine (NICODERM CQ) 14 mg/24hr TD 24 hr patch 14 mg  14 mg Transdermal Daily   • nicotine polacrilex (NICORETTE) gum 2 mg  2 mg Oral Q2H PRN   • ondansetron (ZOFRAN) injection 4 mg  4 mg Intravenous Q6H PRN   • sodium chloride 0 9 % infusion  100 mL/hr Intravenous Continuous       Continuous Infusions:sodium chloride, 100 mL/hr, Last Rate: 100 mL/hr (04/04/23 1753)             Objective       Intake/Output Summary (Last 24 hours) at 4/4/2023 1901  Last data filed at 4/4/2023 1710  Gross per 24 hour   Intake 1000 ml   Output --   Net 1000 ml       Invasive Devices:   Peripheral IV 04/04/23 Right Antecubital (Active)   Site Assessment WDL 04/04/23 1557   Dressing Type Transparent 04/04/23 1557   Line Status Blood return noted; Saline locked; Flushed 04/04/23 1557   Dressing Status Clean;Dry; Intact 04/04/23 1557       Vitals   Vitals:    04/04/23 1544 04/04/23 1557 04/04/23 1727   BP: (!) 146/110  129/92   TempSrc:   Temporal   Pulse: (!) 141 (!) 114 (!) 125   Resp: 18  20   Patient Position - Orthostatic VS:   Sitting   Temp: (!) 97 1 °F (36 2 °C)  99 °F (37 2 °C)       Physical Exam  Vitals reviewed  Constitutional:       General: He is not in acute distress  Appearance: Normal appearance  He is normal weight  He is not ill-appearing or diaphoretic  Comments: Smells of alcohol    HENT:      Head: Normocephalic and atraumatic  Nose: Nose normal  No congestion  Mouth/Throat:      Mouth: Mucous membranes are moist       Pharynx: Oropharynx is clear  Eyes:      General: No scleral icterus  Extraocular Movements: Extraocular movements intact  Right eye: No nystagmus  Left eye: No nystagmus  Conjunctiva/sclera: Conjunctivae normal       Pupils: Pupils are equal, round, and reactive to light  Cardiovascular:      Rate and Rhythm: Regular rhythm  Tachycardia present  Pulses: Normal pulses  Dorsalis pedis pulses are 2+ on the right side and 2+ on the left side  Posterior tibial pulses are 2+ on the right side and 2+ on the left side  Heart sounds: Normal heart sounds  No murmur heard  No friction rub  No gallop  Pulmonary:      Effort: Pulmonary effort is normal  No respiratory distress  Breath sounds: Examination of the right-lower field reveals decreased breath sounds  Examination of the left-lower field reveals decreased breath sounds  Decreased breath sounds present  No wheezing, rhonchi or rales  Abdominal:      General: Abdomen is flat  Bowel sounds are normal  There is no distension  Palpations: Abdomen is soft  There is no pulsatile mass  Tenderness: There is no abdominal tenderness  There is no guarding  Hernia: No hernia is present  Musculoskeletal:         General: No swelling  Normal range of motion  Cervical back: Normal range of motion  Right lower leg: No edema  Left lower leg: No edema  Skin:     General: Skin is warm and dry  Findings: No erythema  Neurological:      General: No focal deficit present  Mental Status: He is alert and oriented to person, place, and time  Mental status is at baseline  Sensory: No sensory deficit  Motor: Tremor (intention tremor b/l UEs) present        Coordination: Finger-Nose-Finger Test normal    Psychiatric:         Attention and Perception: Attention normal          Mood and Affect: Mood is anxious and depressed  Affect is tearful  Speech: Speech normal          Behavior: Behavior is cooperative  Thought Content: Thought content does not include suicidal ideation  Thought content does not include suicidal plan  Data:    EKG, Pathology, and Other Studies: I have personally reviewed pertinent reports  · EKG 4/4/2023: sinus tachycardia,  bpm  QTc 427 ms  No acute ST segment elevation/depression      Lab Results:  CBC ETOH     Lab Results   Component Value Date    WBC 8 23 04/04/2023    RBC 4 58 04/04/2023    HGB 15 2 04/04/2023    HCT 43 7 04/04/2023    MCV 95 04/04/2023    MCH 33 2 04/04/2023    MCHC 34 8 04/04/2023    RDW 14 0 04/04/2023     04/04/2023    MPV 8 7 (L) 04/04/2023      No results found for: LACTICACID   CMP UA         Component Value Date/Time    K 3 5 04/04/2023 1609     04/04/2023 1609    CO2 23 04/04/2023 1609    BUN 13 04/04/2023 1609    CREATININE 0 83 04/04/2023 1609         Component Value Date/Time    CALCIUM 9 7 04/04/2023 1609    ALKPHOS 79 04/04/2023 1609     (H) 04/04/2023 1609    ALT 45 04/04/2023 1609      No results found for: CLARITYU, COLORU, SPECGRAV, PHUR, GLUCOSEU, KETONESU, BLOODU, PROTEIN UA, NITRITE, BILIRUBINUR, UROBILINOGEN, LEUKOCYTESUR, WBCUA, RBCUA, HYALINE, BACTERIA, EPIS     Liver Function Test: ASA     Lab Results   Component Value Date    TBILI 0 67 04/04/2023    ALKPHOS 79 04/04/2023     (H) 04/04/2023    ALT 45 04/04/2023    TP 7 0 04/04/2023    ALB 4 8 04/04/2023      No results found for: SALICYLATE   Troponin APAP     No results found for: TROPONINI   No results found for: ACTMNPHEN   VBG HCG     No results found for: PHVEN, APV8SLZ, PO2VEN, JOX0UUI, BEVEN, Z7ORODXQO, B9WXZZP   No results found for: HCGQUANT   ABG Urine Drug Screen     No results found for: PHART, BOX8QTD, PO2ART, WEL0NAZ, BEART, M4NDQNLNU, O2HGB, SOURC, ESTEVAN, VTAC, ACRATE, INSPIREDAIR, PEEP   Lab Results   Component Value Date    AMPMETHUR Negative 04/04/2023    BARBTUR Negative 04/04/2023    BDZUR Negative 04/04/2023    COCAINEUR Negative 04/04/2023    METHADONEUR Negative 04/04/2023    OPIATEUR Negative 04/04/2023    PCPUR Negative 04/04/2023    THCUR Negative 04/04/2023    OXYCODONEUR Negative 04/04/2023      Lactate INR     No results found for: LACTICACID   No results found for: INR   PTT Protime     No results found for: PTT     No results found for: PROTIME           Imaging Studies: I have personally reviewed pertinent reports  Counseling / Coordination of Care  Total floor / unit time spent today 67 minutes  Greater than 50% of total time was spent with the patient and / or family counseling and / or coordination of care  Minutes of critical care time 22  -Critical care time was exclusive of separately billable procedures and teaching time    -Critical care was necessary to treat or prevent imminent or life-threatening deterioration of the following condition: CNS failure/compromise, toxidrome (ethanol withdrawal),  withdrawal  -Critical care time was spent personally by me on the following activities as well as the above as per the course and rest of chart: obtaining history from patient/surrogate, development of a treatment plan, discussions with referring provider(s), evaluation of patient's response to the treatment, examination of the patient, performing treatments and interventions, re-evaluation of the patient's condition, review of old charts, ordering/interpreting laboratory studies, ordering/interpreting of radiographic studies  ** Please Note: This note has been constructed using a voice recognition system   **

## 2023-04-05 LAB
ALBUMIN SERPL BCP-MCNC: 4.2 G/DL (ref 3.5–5)
ALP SERPL-CCNC: 62 U/L (ref 34–104)
ALT SERPL W P-5'-P-CCNC: 36 U/L (ref 7–52)
ANION GAP SERPL CALCULATED.3IONS-SCNC: 11 MMOL/L (ref 4–13)
AST SERPL W P-5'-P-CCNC: 93 U/L (ref 13–39)
BILIRUB SERPL-MCNC: 0.89 MG/DL (ref 0.2–1)
BUN SERPL-MCNC: 11 MG/DL (ref 5–25)
CALCIUM SERPL-MCNC: 9.2 MG/DL (ref 8.4–10.2)
CHLORIDE SERPL-SCNC: 102 MMOL/L (ref 96–108)
CO2 SERPL-SCNC: 27 MMOL/L (ref 21–32)
CREAT SERPL-MCNC: 0.65 MG/DL (ref 0.6–1.3)
ERYTHROCYTE [DISTWIDTH] IN BLOOD BY AUTOMATED COUNT: 13.8 % (ref 11.6–15.1)
GFR SERPL CREATININE-BSD FRML MDRD: 111 ML/MIN/1.73SQ M
GLUCOSE SERPL-MCNC: 89 MG/DL (ref 65–140)
HCT VFR BLD AUTO: 39.1 % (ref 36.5–49.3)
HGB BLD-MCNC: 13.3 G/DL (ref 12–17)
LIPASE SERPL-CCNC: 83 U/L (ref 11–82)
MAGNESIUM SERPL-MCNC: 1.8 MG/DL (ref 1.9–2.7)
MCH RBC QN AUTO: 32.9 PG (ref 26.8–34.3)
MCHC RBC AUTO-ENTMCNC: 34 G/DL (ref 31.4–37.4)
MCV RBC AUTO: 97 FL (ref 82–98)
PLATELET # BLD AUTO: 168 THOUSANDS/UL (ref 149–390)
PMV BLD AUTO: 9 FL (ref 8.9–12.7)
POTASSIUM SERPL-SCNC: 3.6 MMOL/L (ref 3.5–5.3)
PROT SERPL-MCNC: 6.8 G/DL (ref 6.4–8.4)
RBC # BLD AUTO: 4.04 MILLION/UL (ref 3.88–5.62)
SODIUM SERPL-SCNC: 140 MMOL/L (ref 135–147)
WBC # BLD AUTO: 4.96 THOUSAND/UL (ref 4.31–10.16)

## 2023-04-05 RX ORDER — PHENOBARBITAL SODIUM 130 MG/ML
130 INJECTION INTRAMUSCULAR ONCE
Status: COMPLETED | OUTPATIENT
Start: 2023-04-05 | End: 2023-04-05

## 2023-04-05 RX ADMIN — TRAZODONE HYDROCHLORIDE 100 MG: 100 TABLET ORAL at 21:51

## 2023-04-05 RX ADMIN — PHENOBARBITAL SODIUM 130 MG: 130 INJECTION INTRAMUSCULAR at 05:30

## 2023-04-05 RX ADMIN — NICOTINE POLACRILEX 2 MG: 2 GUM, CHEWING BUCCAL at 16:28

## 2023-04-05 RX ADMIN — ENOXAPARIN SODIUM 40 MG: 40 INJECTION SUBCUTANEOUS at 07:43

## 2023-04-05 RX ADMIN — PHENOBARBITAL SODIUM 130 MG: 130 INJECTION INTRAMUSCULAR at 20:52

## 2023-04-05 RX ADMIN — ONDANSETRON 4 MG: 2 INJECTION INTRAMUSCULAR; INTRAVENOUS at 20:21

## 2023-04-05 RX ADMIN — PHENOBARBITAL SODIUM 130 MG: 130 INJECTION INTRAMUSCULAR at 20:14

## 2023-04-05 RX ADMIN — NICOTINE 14 MG: 14 PATCH, EXTENDED RELEASE TRANSDERMAL at 07:39

## 2023-04-05 RX ADMIN — BUSPIRONE HYDROCHLORIDE 15 MG: 10 TABLET ORAL at 07:40

## 2023-04-05 RX ADMIN — SODIUM CHLORIDE 100 ML/HR: 0.9 INJECTION, SOLUTION INTRAVENOUS at 16:20

## 2023-04-05 RX ADMIN — PHENOBARBITAL SODIUM 130 MG: 130 INJECTION INTRAMUSCULAR at 07:52

## 2023-04-05 RX ADMIN — ESCITALOPRAM OXALATE 10 MG: 10 TABLET ORAL at 07:40

## 2023-04-05 RX ADMIN — PHENOBARBITAL SODIUM 130 MG: 130 INJECTION INTRAMUSCULAR at 16:20

## 2023-04-05 RX ADMIN — PHENOBARBITAL SODIUM 130 MG: 130 INJECTION INTRAMUSCULAR at 08:43

## 2023-04-05 RX ADMIN — SODIUM CHLORIDE 100 ML/HR: 0.9 INJECTION, SOLUTION INTRAVENOUS at 05:07

## 2023-04-05 RX ADMIN — BUSPIRONE HYDROCHLORIDE 15 MG: 10 TABLET ORAL at 20:13

## 2023-04-05 NOTE — DISCHARGE INSTR - OTHER ORDERS
Drug and Alcohol Resources in [facility]    If you have health insurance, including medical assistance, there should be a phone number on your insurance card that you can call to find out how to access services  The card may say, “For [address] or “For Drug and Alcohol Services” or “For Substance Abuse Services” call the number provided  [facility]  [place] [address], [facility], [address]   [phone]  A  is available Monday through Friday from 8:00 am to 5:00 pm to provide you with assistance on accessing services for substance abuse  If you do not have health insurance and are in financial need, this office may also help you get the funding for the services that are necessary  [facility] provides funding to support three Recovery Centers in [facility]  These centers offer a safe, sober environment to those in recovery  A variety of programming including 12-Step Meetings, Rian Medina, Life Skills Workshops, etc  is offered at each location  [address] [place] [address]  [phone]  www  cleanslatebangor  org [employer]  [facility], [address] [facility]  [phone]  tdwmhn-da-azqt  [address], [address] [facility]  [phone]   [address] [place] [address]  [phone]  www  [facility], [address] [place] [address]  [phone]  [facility] [address]  Confidential free help, from public health agencies, to find substance use treatment and information  [phone]    Link for Zoom Codes for Virtual 12 step Meetings  Gia alvarado uk  aspx  Alcoholics Anonymous  [facility]  [phone]    http://www meza com/  Narcotics Anonymous  [phone]  [email or web address]://[email or web address]

## 2023-04-05 NOTE — CASE MANAGEMENT
Pt's cousin Ginger contacted the unit regarding pt, pt gave worker verbal permission to speak with his cousin  Worker provided her with update regarding pt and will continue to follow-up as needed  Pt's cousin did not express any questions or concerns at this time

## 2023-04-05 NOTE — ASSESSMENT & PLAN NOTE
· H/o chronic alcohol use; unsure of h/o withdrawal seizures (never formally diagnosed)  · Last drink: 4/4/23   · BAT: 0 242   · Received 2 mg lorazepam IV in ED PTA   · Continue SEWS protocol with symptom-triggered phenobarbital for medically-assisted alcohol withdrawal  · Current symptoms include: headache, diaphoresis, nausea, tremors   · Patient has received a total of 780 mg of phenobarbital  Last Dose Administered on 4/5/23 at 0843   · Continue to monitor vitals, symptoms and administer additional phenobarbital as indicated   · Continuous pulse oximetry and telemetry monitoring

## 2023-04-05 NOTE — ASSESSMENT & PLAN NOTE
Recent Labs     04/05/23  0523   AST 93*   ALT 36   ALKPHOS 62   TBILI 0 89      · In setting of chronic alcohol use, has improved since admission   · No acute abdominal pain on exam   · Continue to trend LFTs  · Encourage alcohol cessation

## 2023-04-05 NOTE — ASSESSMENT & PLAN NOTE
· Pt with a h/o chronic heavy alcohol since age 12   · Previous h/o inpatient rehab, most recently [facility] x 28 days Nov - early Dec  2022  · Reports he was sober for about 4 weeks until early January when he relapsed   · Has been drinking fifth to half gallon of vodka daily since relapse; notes he wakes up in the middle of the night to drink  · Previously on Naltrexone and Vivitrol - reports did not help   · Withdrawal management as above  · Continue IVFs, daily thiamine/folic acid supplementation, and MVI  · Consult case management for assistance with aftercare resources - pt interested in outpatient resources with [address] at this time

## 2023-04-05 NOTE — PROGRESS NOTES
04/05/23 0844   Referral Data   Referral Source Other (Comment)   Referral Name [address]   Referral Reason Drug/Alcohol [facility]   Readmission Root Cause   30 Day Readmission No   Patient Information   Mental Status Alert   Primary Caregiver Self   Support System Immediate family;Friends   Legal Information   Legal Issues Denies   Activities of Daily Living Prior to Admission   Functional Status Independent   Assistive Device No device needed   Living Arrangement Lives alone   Ambulation Independent   Access to Firearms   Access to Firearms No   Income [address] [employer] and [facility] [employer] of Transportation   Means of Transport to Appts: Drives Self        44/11/94 0843   Substance Abuse Addendum Details   History of Withdrawal Symptoms Other withdrawal symptoms (specify in comment)  (sweating, tremors, anxiety)   Medical Complications nodule of chest wall   Sober Supports Cousin and friend   Present Treatment None   Substance Abuse Treatment Hx Past Tx, Inpatient; Past Tx, Outpatient; Past detox   ASAM Level & Criteria 3 5 inpatient   Stage of Change   Stage of Change Precontemplation       Additional Substance Use Detail    Questions Responses   Problems Due to Past Use of Alcohol? Yes   Alcohol Use Frequency Daily   Alcohol Drink of Choice Vodka   1st Use of Alcohol 14   Last Use of Alcohol & Amount 1/5 of vodka, 4/4/2023   Longest Abstinence from Alcohol 2 years   Cocaine frequency Past regular use       Worker completed assessment  Worker explained role of case management  Worker confirmed name, address and phone number  Pt presented to his family doctor's office on 4/4/2023 and reported drinking alcohol, was encouraged to go to [facility] for detox evaluation, was transferred to  for withdrawal management  Pt reports he relapsed in January 2023 after having 3-4 weeks sober    Pt reports he has been drinking 1 bottle of vodka daily  Pt reports first age of use 12, last used 4/4/2023  Pt reports drinking alcohol daily since the age of 12  Pt reports a hx of blackouts  Pt reports longest period of sobriety is 2 years  Pt reports a hx of cocaine use, has no used in over 15 years  Pt reports previous inpatient rehab at [place] in 2015 for 90 days and most recent at [facility] in November-December 2022  Pt reports upon discharge from [facility] he was scheduled to follow-up with [address] for outpatient rehab  Pt reports he did not complete outpatient rehab as he stopped attending at [address]  Pt at this time uncertain if he wants any inpatient or outpatient rehab  AUDIT: no score  PAWSS:1  Alcohol:  242  UDS: negative    Pt is currently single, has 2 children from his first marriage  Pt reports he was with his girlfriend for over 21 years but she recently left him  Pt currently resides at [facility] [place] [address]  Pt reports he is currently unemployed but is scheduled to start a new job as a   Pt's highest level of education is HS Diploma  Pt denies any current legal issues  Pt reports depression and anxiety, is prescribed medications for such  Pt denies any previous inpatient psychiatric hospitalizations  Pt denies any outpatient psychiatric provider  Pt denies any suicide attempts  Pt denies any abuse or losses  Pt reports medical issues of nodule of chest wall, PCP is [address], signed DELL  Pt reports his PCP would like to see him in a week, worker will schedule TCM appointment  Pt reports having health insurance of [employer], signed DELL's for both  Pt declined to sign DELL for any family or friends at this time  Relapse prevention plan was completed  Pt was able to identify his warning signs as well as coping skills  Pt reports his friends and cousin are his primary supports    Clinical impression, pt was calm and cooperative throughout assessment  Pt was actively withdrawing during assessment, severely sweating and tremor  Pt was able to report his current use pattern  Pt does appear to lack insight into his drug and alcohol treatment at this time  Pt would benefit from attending inpatient rehab to address grief regarding recent relationship ending as well as long standing alcohol use  Pt would also benefit in learning additional coping skills  Pt would benefit in re-establishing with AA meetings  Pt at this time uncertain if he wants inpatient or outpatient rehab, pt is in the pre-contemplation stage of change

## 2023-04-05 NOTE — UTILIZATION REVIEW
Initial Clinical Review    Pt presented to [address] [place] [address] for its Level IV medically managed intensive inpatient detox unit  Admission: Date/Time/Statement:   Admission Orders (From admission, onward)     Ordered        04/04/23 1641  INPATIENT ADMISSION  Once                      Orders Placed This Encounter   Procedures   • INPATIENT ADMISSION     Standing Status:   Standing     Number of Occurrences:   1     Order Specific Question:   Level of Care     Answer:   Med Surg [16]     Order Specific Question:   Estimated length of stay     Answer:   More than 2 Midnights     Order Specific Question:   Certification     Answer:   I certify that inpatient services are medically necessary for this patient for a duration of greater than two midnights  See H&P and MD Progress Notes for additional information about the patient's course of treatment  ED Arrival Information     Expected   4/4/2023     Arrival   4/4/2023 15:37    Acuity   Urgent            Means of arrival   Walk-In    Escorted by   [facility] [facility]    Admission type   Emergency            Arrival complaint   alcohol dependence           Chief Complaint   Patient presents with   • Detox Evaluation     Pt came to ER for detox evaluation from ETOH with last drink prior to arrival to ER  Pt reports that he drinks a fifth of vodka per day  Pt thinks he may have had a seizure in the past due to withdrawal but he is not sure  Pt denies withdrawal symptoms at this time  Initial Presentation: 47 y o  male who presented to medical detox  Inpatient admission for evaluation and treatment of alcohol withdrawal syndrome  Presented w/ need for detox from alcohol  BAT: 0 242  Reports a fifth to a half gallon of vodka daily, last drink on 4/4 @ 1430  Has prior rehab treatment for withdrawal  Reports hx of withdrawal seizures  On exam, anxiety, tremors, tachycardic, hypertenion   SEWS 11  Plan: SEWS monitoring w/ phenobarbital management, IV thiamine/folic acid supplement, IVF, telemetry, continuous pulse ox, continue PTA meds, trend labs, replete electrolytes as needed  Date: 04/05/23       Day 2: Pt reports continued withdrawal symptoms  On exam, diaphoretic, tremors, anxious  SEWS 0  Plan: continue SEWS monitoring w/ phenobarbital management, IV thiamine/folic acid supplement, telemetry, continuous pulse ox, continue PTA meds, trend labs, replete electrolytes as needed  ED Triage Vitals   Temperature Pulse Respirations Blood Pressure SpO2   04/04/23 1544 04/04/23 1544 04/04/23 1544 04/04/23 1544 04/04/23 1544   (!) 97 1 °F (36 2 °C) (!) 141 18 (!) 146/110 98 %      Temp Source Heart Rate Source Patient Position - Orthostatic VS BP Location FiO2 (%)   04/04/23 1727 04/04/23 1544 04/04/23 1727 04/04/23 1727 --   Temporal Monitor Sitting Left arm       Pain Score       04/04/23 1727       No Pain          Wt Readings from Last 1 Encounters:   04/04/23 89 2 kg (196 lb 10 4 oz)     Vital Signs:   Date/Time Temp Pulse Resp BP MAP (mmHg) SpO2 O2 Device   04/05/23 1100 97 7 °F (36 5 °C) 78 18 148/88 108 98 % None (Room air)   04/05/23 0700 97 9 °F (36 6 °C) 75 18 155/92 113 97 % None (Room air)   04/05/23 0520 97 1 °F (36 2 °C) Abnormal  70 16 149/97 -- 98 % None (Room air)   04/04/23 1939 98 6 °F (37 °C) 108 Abnormal  16 129/68 -- 94 % None (Room air)   04/04/23 1727 99 °F (37 2 °C) 125 Abnormal  20 129/92 -- 94 % None (Room air)       Severity of Ethanol Withdrawal Scale (SEWS):   Row Name 04/05/23 1400 04/05/23 1300 04/05/23 1200 04/05/23 1100 04/05/23 1000   Severity of Ethanol Withdrawal Scale (SEWS)   ANXIETY: Do you feel that something bad is about to happen to you right now? 0  -GEETHA 0  -GEETHA 0  -GEETHA 0  -GEETHA 0  -GEETHA   NAUSEA and DRY HEAVES or VOMITING? 0  -GEETHA 0  -GEETHA 0  -GEETHA 0  -GEETHA 0  -GEETHA   SWEATING: (includes moist palms, sweating now)? Score 0 or 2 0  -GEETHA 0  -GEETHA 0  -GEETHA 0  -GEETHA 0  -GEETHA   TREMOR: with arms extended eyes closed?   0  -GEETHA 0  -GEETHA 0  -GEETHA 0  -GEETHA 0  -GEETHA   AGITATION: Fidgety, restless, pacing? 0  -GEETHA 0  -GEETHA 0  -GEETHA 0  -GEETHA 0  -GEETHA   DISORIENTATION: 0  -GEETHA 0  -GEETHA 0  -GEETHA 0  -GEETHA 0  -GEETHA   HALLUCINATIONS: 0  -GEETHA 0  -GEETHA 0  -GEETHA 0  -GEETHA 0  -GEETHA   VITAL SIGNS: ANY (Pulse >577, Diastolic BP >16, Temp >22 9) 0  -GEETHA 0  -GEETHA 0  -GEETHA 0  -GEETHA 0  -GEETHA   SEWS Total Score 0  -GEETHA 0  -GEETHA 0  -GEETHA 0  -GEETHA 0  -GEETHA   Santamaria Agitation Sedation Scale (RASS)   Santamaria Agitation Sedation Scale (RASS) 0  -GEETHA 0  -GEETHA 0  -GEETHA 0  -GEETHA 0  -GEETHA   Row Name 04/05/23 0915 04/05/23 0837 04/05/23 0736 04/05/23 0550 04/05/23 0521   Severity of Ethanol Withdrawal Scale (SEWS)   ANXIETY: Do you feel that something bad is about to happen to you right now? 0  -GEETHA 3  -GEETHA 3  -GEETHA 0  -SB 0  -SB   NAUSEA and DRY HEAVES or VOMITING? 0  -GEETHA 0  -GEETHA 0  -GEETHA 0  -SB 0  -SB   SWEATING: (includes moist palms, sweating now)? Score 0 or 2 0  -GEETHA 2  -GEETHA 2  -GEETHA 0  -SB 2  -SB   TREMOR: with arms extended eyes closed? 0  -GEETHA 2  -GEETHA 2  -GEETHA 0  -SB 2  -SB   AGITATION: Fidgety, restless, pacing? 0  -GEETHA 3  -GEETHA 3  -GEETHA 0  -SB 3  -SB   DISORIENTATION: 0  -GEETHA 0  -GEETHA 0  -GEETHA 0  -SB 0  -SB   HALLUCINATIONS: 0  -GEETHA 0  -GEETHA 0  -GEETHA 0  -SB 0  -SB   VITAL SIGNS: ANY (Pulse >827, Diastolic BP >26, Temp >28 1) 0  -GEETHA 0  -GEETHA 3  -GEETHA 0  -SB 3  -SB   SEWS Total Score 0  -GEETHA 10  -GEETHA 13  -GEETHA 0  -SB 10  -SB   Santamaria Agitation Sedation Scale (RASS)   Santamaria Agitation Sedation Scale (RASS) 0  -GEETHA 0  -GEETHA 0  -GEETHA -1  -SB +1  -SB   Row Name 04/04/23 2115 04/04/23 2015 04/04/23 1940 04/04/23 1800    Severity of Ethanol Withdrawal Scale (SEWS)   ANXIETY: Do you feel that something bad is about to happen to you right now? 0  -SB 0  -SB 3  -SB 3  -LB    NAUSEA and DRY HEAVES or VOMITING? 0  -SB 0  -SB 0  -SB 0  -LB    SWEATING: (includes moist palms, sweating now)? Score 0 or 2 0  -SB 0  -SB 0  -SB 0  -LB    TREMOR: with arms extended eyes closed? 0  -SB 0  -SB 2  -SB 2  -LB    AGITATION: Fidgety, restless, pacing?  0  -SB 0  -SB 3  -SB 3  -LB DISORIENTATION: 0  -SB 0  -SB 0  -SB 0  -LB    HALLUCINATIONS: 0  -SB 0  -SB 0  -SB 0  -LB    VITAL SIGNS: ANY (Pulse >515, Diastolic BP >88, Temp >44 0) 0  -SB 0  -SB 0  -SB 3  -LB    SEWS Total Score 0  -SB 0  -SB 8  -SB 11  -LB          Pertinent Labs/Diagnostic Test Results:     Results from last 7 days   Lab Units 04/05/23  0523 04/04/23  1609   WBC Thousand/uL 4 96 8 23   HEMOGLOBIN g/dL 13 3 15 2   HEMATOCRIT % 39 1 43 7   PLATELETS Thousands/uL 168 219   NEUTROS ABS Thousands/µL  --  4 80         Results from last 7 days   Lab Units 04/05/23  0523 04/04/23  1609   SODIUM mmol/L 140 142   POTASSIUM mmol/L 3 6 3 5   CHLORIDE mmol/L 102 101   CO2 mmol/L 27 23   ANION GAP mmol/L 11 18*   BUN mg/dL 11 13   CREATININE mg/dL 0 65 0 83   EGFR ml/min/1 73sq m 111 100   CALCIUM mg/dL 9 2 9 7   MAGNESIUM mg/dL 1 8* 2 1     Results from last 7 days   Lab Units 04/05/23  0523 04/04/23  1609   AST U/L 93* 122*   ALT U/L 36 45   ALK PHOS U/L 62 79   TOTAL PROTEIN g/dL 6 8 7 0   ALBUMIN g/dL 4 2 4 8   TOTAL BILIRUBIN mg/dL 0 89 0 67         Results from last 7 days   Lab Units 04/05/23  0523 04/04/23  1609   GLUCOSE RANDOM mg/dL 89 116     Results from last 7 days   Lab Units 04/05/23  0523 04/04/23  1609   LIPASE u/L 83* 95*     Results from last 7 days   Lab Units 04/04/23  1701   AMPH/METH  Negative   BARBITURATE UR  Negative   BENZODIAZEPINE UR  Negative   COCAINE UR  Negative   METHADONE URINE  Negative   OPIATE UR  Negative   PCP UR  Negative   THC UR  Negative           ED Treatment:   Medication Administration from 04/04/2023 1417 to 04/04/2023 1718       Date/Time Order Dose Route Action     04/04/2023 1616 EDT LORazepam (ATIVAN) injection 2 mg 2 mg Intravenous Given        Past Medical History:   Diagnosis Date   • Alcohol addiction ([facility] [address] ) 12/14/2015   • Anxiety 12/14/2022   • Nicotine use disorder 12/14/2022   • Recurrent major depressive disorder, in partial remission ([facility] [address] ) 12/14/2022     Present on Admission:  • Recurrent major depressive disorder, in partial remission (HCC)  • Nicotine use disorder  • Elevated LFTs  • Elevated lipase  • Nodule of chest wall      Admitting Diagnosis: Alcohol abuse [F10 10]  Alcohol use disorder, severe, dependence ([facility] [address] ) [F10 20]  Admitted to substance misuse detoxification center [Z78 9]  Age/Sex: 47 y o  male  Admission Orders:  Regular Diet  SCDs  Fall & Seizure Precautions  SEWS monitoring  Telemetry & Continuous Pulse Ox  Scheduled Medications:  busPIRone, 15 mg, Oral, BID  enoxaparin, 40 mg, Subcutaneous, Daily  escitalopram, 10 mg, Oral, Daily  folic acid 1 mg, thiamine 100 mg in 0 9% sodium chloride 100 mL IVPB, , Intravenous, Daily  nicotine, 14 mg, Transdermal, Daily      Continuous IV Infusions:  sodium chloride, 100 mL/hr, Intravenous, Continuous      PRN Meds:  acetaminophen, 650 mg, Oral, Q6H PRN  acetaminophen, 650 mg, Oral, Q6H PRN  albuterol, 2 puff, Inhalation, Q4H PRN  nicotine polacrilex, 2 mg, Oral, Q2H PRN; 4/4 x1  ondansetron, 4 mg, Intravenous, Q6H PRN  traZODone, 100 mg, Oral, HS PRN  diazepam, 10 mg, Intravenous; 4/4 x1  phenobarbital, 260 mg, Intravenous; 4/4 x1  phenobarbital, 130 mg, Intravenous; 4/4 x1, 4/5 x3        IP CONSULT TO CASE MANAGEMENT    Network Utilization Review Department  ATTENTION: Please call with any questions or concerns to [phone] and carefully listen to the prompts so that you are directed to the right person  All voicemails are confidential   [facility] all requests for admission clinical reviews, approved or denied determinations and any other requests to dedicated fax number below belonging to the campus where the patient is receiving treatment   List of dedicated fax numbers for the Facilities:  FACILITY NAME UR FAX NUMBER   ADMISSION DENIALS (Administrative/Medical Necessity) [phone]   [address] (Maternity/NICU/Pediatrics) Niharika Huffman 172 [phone]     Anna Holt [address] [facility] 77 [phone]   [address] [place] [address] Vandana Patel 28 [phone] [address] [place] [address] 8[address] [facility] [phone]

## 2023-04-05 NOTE — PLAN OF CARE
Problem: Nutrition/Hydration-ADULT  Goal: Nutrient/Hydration intake appropriate for improving, restoring or maintaining nutritional needs  Description: Monitor and assess patient's nutrition/hydration status for malnutrition  Collaborate with interdisciplinary team and initiate plan and interventions as ordered  Monitor patient's weight and dietary intake as ordered or per policy  Utilize nutrition screening tool and intervene as necessary  Determine patient's food preferences and provide high-protein, high-caloric foods as appropriate       INTERVENTIONS:  - Monitor oral intake, urinary output, labs, and treatment plans  - Assess nutrition and hydration status and recommend course of action  - Evaluate amount of meals eaten  - Assist patient with eating if necessary   - Allow adequate time for meals  - Recommend/ encourage appropriate diets, oral nutritional supplements, and vitamin/mineral supplements  - Order, calculate, and assess calorie counts as needed  - Recommend, monitor, and adjust tube feedings and TPN/PPN based on assessed needs  - Assess need for intravenous fluids  - Provide specific nutrition/hydration education as appropriate  - Include patient/family/caregiver in decisions related to nutrition  Outcome: Progressing     Problem: PAIN - ADULT  Goal: Verbalizes/displays adequate comfort level or baseline comfort level  Description: Interventions:  - Encourage patient to monitor pain and request assistance  - Assess pain using appropriate pain scale  - Administer analgesics based on type and severity of pain and evaluate response  - Implement non-pharmacological measures as appropriate and evaluate response  - Consider cultural and social influences on pain and pain management  - Notify physician/advanced practitioner if interventions unsuccessful or patient reports new pain  Outcome: Progressing     Problem: INFECTION - ADULT  Goal: Absence or prevention of progression during hospitalization  Description: INTERVENTIONS:  - Assess and monitor for signs and symptoms of infection  - Monitor lab/diagnostic results  - Monitor all insertion sites, i e  indwelling lines, tubes, and drains  - Monitor endotracheal if appropriate and nasal secretions for changes in amount and color  - Stedman appropriate cooling/warming therapies per order  - Administer medications as ordered  - Instruct and encourage patient and family to use good hand hygiene technique  - Identify and instruct in appropriate isolation precautions for identified infection/condition  Outcome: Progressing  Goal: Absence of fever/infection during neutropenic period  Description: INTERVENTIONS:  - Monitor WBC    Outcome: Progressing     Problem: SAFETY ADULT  Goal: Patient will remain free of falls  Description: INTERVENTIONS:  - Educate patient/family on patient safety including physical limitations  - Instruct patient to call for assistance with activity   - Consult OT/PT to assist with strengthening/mobility   - Keep Call bell within reach  - Keep bed low and locked with side rails adjusted as appropriate  - Keep care items and personal belongings within reach  - Initiate and maintain comfort rounds  - Make Fall Risk Sign visible to staff  - Apply yellow socks and bracelet for high fall risk patients  - Consider moving patient to room near nurses station  Outcome: Progressing  Goal: Maintain or return to baseline ADL function  Description: INTERVENTIONS:  -  Assess patient's ability to carry out ADLs; assess patient's baseline for ADL function and identify physical deficits which impact ability to perform ADLs (bathing, care of mouth/teeth, toileting, grooming, dressing, etc )  - Assess/evaluate cause of self-care deficits   - Assess range of motion  - Assess patient's mobility; develop plan if impaired  - Assess patient's need for assistive devices and provide as appropriate  - Encourage maximum independence but intervene and supervise when necessary  - Involve family in performance of ADLs  - Assess for home care needs following discharge   - Consider OT consult to assist with ADL evaluation and planning for discharge  - Provide patient education as appropriate  Outcome: Progressing  Goal: Maintains/Returns to pre admission functional level  Description: INTERVENTIONS:  - Perform BMAT or MOVE assessment daily    - Set and communicate daily mobility goal to care team and patient/family/caregiver  - Collaborate with rehabilitation services on mobility goals if consulted  - Out of bed for toileting  - Record patient progress and toleration of activity level   Outcome: Progressing     Problem: DISCHARGE PLANNING  Goal: Discharge to home or other facility with appropriate resources  Description: INTERVENTIONS:  - Identify barriers to discharge w/patient and caregiver  - Arrange for needed discharge resources and transportation as appropriate  - Identify discharge learning needs (meds, wound care, etc )  - Arrange for interpretive services to assist at discharge as needed  - Refer to Case Management Department for coordinating discharge planning if the patient needs post-hospital services based on physician/advanced practitioner order or complex needs related to functional status, cognitive ability, or social support system  Outcome: Progressing     Problem: Knowledge Deficit  Goal: Patient/family/caregiver demonstrates understanding of disease process, treatment plan, medications, and discharge instructions  Description: Complete learning assessment and assess knowledge base    Interventions:  - Provide teaching at level of understanding  - Provide teaching via preferred learning methods  Outcome: Progressing

## 2023-04-05 NOTE — ASSESSMENT & PLAN NOTE
Recent Labs     04/04/23  1609 04/05/23  0523   LIPASE 95* 83*     · In setting of chronic alcohol use  · No acute abdominal pain on exam; tolerating PO intake   · Encourage alcohol cessation

## 2023-04-05 NOTE — PROGRESS NOTES
"  Progress Note - Medical Toxicology    Soraida Mims 47 y o  male MRN: [id number]  Unit/Bed#: 5T DETOX 505-01 Encounter: [id number]  [address] [place] [address], LEVEL 4  Department of Medical Toxicology  Reason for Admission/Principal Problem: alcohol withdrawal   Rounding Provider: Luh Bustillo PA-C, Gale Amaya DO         * Alcohol withdrawal syndrome with complication, with unspecified complication [facility])  Assessment & Plan  · H/o chronic alcohol use; unsure of h/o withdrawal seizures (never formally diagnosed)  · Last drink: 4/4/23   · BAT: 0 242   · Received 2 mg lorazepam IV in ED PTA   · Continue SEWS protocol with symptom-triggered phenobarbital for medically-assisted alcohol withdrawal  · Current symptoms include: headache, diaphoresis, nausea, tremors   · Patient has received a total of 780 mg of phenobarbital  Last Dose Administered on 4/5/23 at 0843   · Continue to monitor vitals, symptoms and administer additional phenobarbital as indicated   · Continuous pulse oximetry and telemetry monitoring     Alcohol use disorder, severe, dependence ([facility] [address] )  Assessment & Plan  · Pt with a h/o chronic heavy alcohol since age 12   · Previous h/o inpatient rehab, most recently [facility] x 28 days Nov - early Dec  2022  · Reports he was sober for about 4 weeks until early January when he relapsed   · Has been drinking fifth to half gallon of vodka daily since relapse; notes he wakes up in the middle of the night to drink  · Previously on Naltrexone and Vivitrol - reports did not help   · Withdrawal management as above  · Continue IVFs, daily thiamine/folic acid supplementation, and MVI  · Consult case management for assistance with aftercare resources - pt interested in outpatient resources with [id number] at this time    Nodule of chest wall  Assessment & Plan  · Patient states \"I think I may have a hernia\"  · Notes he has had a \"lump\" on chest wall for a few yrs; denies acute symptoms (non-tender)  · On exam: " non-erythematous quarter-sized nodule palpated inferior to xiphoid process, non-tender  · No hernia noted on abdominal exam   · Unclear etiology- possibly lipoma  · Recommend outpatient f/u PCP    Elevated lipase  Assessment & Plan  Recent Labs     04/04/23  1609 04/05/23  0523   LIPASE 95* 83*     · In setting of chronic alcohol use  · No acute abdominal pain on exam; tolerating PO intake   · Encourage alcohol cessation     H/O wheezing  Assessment & Plan  · Current every day smoker   · Reports h/o wheezing with exertion  · Notes he thinks he may have COPD but has never been formally diagnosed  · Currently denies acute symptoms- denies SOB, chest pain   · Lungs CTA b/l, no WRRs; lungs sound mildly diminished at b/l bases  · Will order albuterol PRN  · Encourage smoking cessation  · Recommend outpatient f/u PCP, pulmonology for further work-up     Elevated LFTs  Assessment & Plan  Recent Labs     04/05/23  0523   AST 93*   ALT 36   ALKPHOS 62   TBILI 0 89      · In setting of chronic alcohol use, has improved since admission   · No acute abdominal pain on exam   · Continue to trend LFTs  · Encourage alcohol cessation     Recurrent major depressive disorder, in partial remission ([facility] [address] )  Assessment & Plan  · Patient endorses a h/o ongoing depression  · Current Home medications buspar 15 mg BID, lexapro 10 mg daily, Trazodone 100mg HS PRN  · Reports that he was taking Buspar 15 mg daily as he was only able to afford 30 tabs instead of 60 tabs  · Denies SI/HI/thoughts of self harm  · Continue home medication regimen  · Recommend OP f/u with PCP/OP Psychiatry    Nicotine use disorder  Assessment & Plan  Current every day smoker: 0 5 ppd  Encourage cessation  Offer nicotine replacement            VTE Pharmacologic Prophylaxis:   Pharmacologic: Enoxaparin (Lovenox)  Mechanical VTE Prophylaxis in Place: yes    Code Status: Level 1 - Full Code    Patient Centered Rounds: I have performed bedside rounds with nursing staff today     Discussions with Specialists or Other Care Team Provider: Case Management    Education and Discussions with Family / Patient: I personally answered all of the patient's questions to the best of my ability     Time Spent for Care: 30 minutes  More than 50% of total time spent on counseling and coordination of care as described above  Current Length of Stay: 1 day(s)    Current Patient Status: Inpatient     Certification Statement: The patient will continue to require additional inpatient hospital stay due to alcohol withdrawal Discharge Plan: Anticipated Discharge         Subjective:   Patient is seen and examined at bedside  Reports continued withdrawal symptoms of anxiety, tremors, and sweats  Denies any physical complaints of chest pain, shortness of breath, or abdominal pain       Objective:     Clinical Opiate Withdrawal Scale  Pulse: 75    SEWS Total Score: 10 (4/5/2023  8:37 AM)        Last 24 Hours Medication List:   Current Facility-Administered Medications   Medication Dose Route Frequency Provider Last Rate   • acetaminophen  650 mg Oral Q6H PRN Tatyana Kemp PA-C     • acetaminophen  650 mg Oral Q6H PRN Tatyana Kemp PA-C     • albuterol  2 puff Inhalation Q4H PRN Eliana Dawkins PA-C     • busPIRone  15 mg Oral BID Eliana Dawkins PA-C     • enoxaparin  40 mg Subcutaneous Daily Tatyana Kemp PA-C     • escitalopram  10 mg Oral Daily Eliana Dawkins PA-C     • folic acid 1 mg, thiamine 100 mg in 0 9% sodium chloride 100 mL IVPB   Intravenous Daily Tatyana Kemp PA-C     • nicotine  14 mg Transdermal Daily Eliana Dawkins PA-C     • nicotine polacrilex  2 mg Oral Q2H PRN Eliana Dawkins PA-C     • ondansetron  4 mg Intravenous Q6H PRN Tatyana Kemp PA-C     • sodium chloride  100 mL/hr Intravenous Continuous Tatyana Kemp PA-C 100 mL/hr (04/05/23 6368)   • traZODone  100 mg Oral HS PRN Eliana Dawkins PA-C Vitals:   Temp (24hrs), Av 8 °F (36 6 °C), Min:97 1 °F (36 2 °C), Max:99 °F (37 2 °C)    Temp:  [97 1 °F (36 2 °C)-99 °F (37 2 °C)] 97 9 °F (36 6 °C)  HR:  [] 75  Resp:  [16-20] 18  BP: (129-155)/() 155/92  SpO2:  [94 %-98 %] 97 %  Body mass index is 29 8 kg/m²  Input and Output Summary (last 24 hours): Intake/Output Summary (Last 24 hours) at 2023 1045  Last data filed at 2023 0830  Gross per 24 hour   Intake 1240 ml   Output --   Net 1240 ml       Physical Exam:   Physical Exam  Constitutional:       General: He is not in acute distress  Appearance: He is diaphoretic  Eyes:      General: No scleral icterus  Pupils: Pupils are equal, round, and reactive to light  Cardiovascular:      Rate and Rhythm: Normal rate and regular rhythm  Pulmonary:      Effort: Pulmonary effort is normal  No respiratory distress  Breath sounds: Normal breath sounds  Abdominal:      General: There is no distension  Palpations: Abdomen is soft  Neurological:      Mental Status: He is oriented to person, place, and time  Motor: Tremor present  Psychiatric:         Mood and Affect: Mood is anxious  Mood is not depressed           Additional Data:     Labs: keep all most recent labs as listed on admission templates   Results from last 7 days   Lab Units 23  0523  1609   WBC Thousand/uL 4 96 8 23   HEMOGLOBIN g/dL 13 3 15 2   HEMATOCRIT % 39 1 43 7   PLATELETS Thousands/uL 168 219   NEUTROS PCT %  --  59   LYMPHS PCT %  --  26   MONOS PCT %  --  13*   EOS PCT %  --  1      Results from last 7 days   Lab Units 23  0523   SODIUM mmol/L 140   POTASSIUM mmol/L 3 6   CHLORIDE mmol/L 102   CO2 mmol/L 27   BUN mg/dL 11   CREATININE mg/dL 0 65   ANION GAP mmol/L 11   CALCIUM mg/dL 9 2   ALBUMIN g/dL 4 2   TOTAL BILIRUBIN mg/dL 0 89   ALK PHOS U/L 62   ALT U/L 36   AST U/L 93*   GLUCOSE RANDOM mg/dL 89                              * I Have Reviewed All Lab Data Listed Above  * Additional Pertinent Lab Tests Reviewed: Timothy 66 Admission Reviewed      Imaging Studies: I have personally reviewed pertinent reports  Recent Cultures (last 7 days): Today, Patient Was Seen By: Terrance Donis PA-C    ** Please Note: Dictation voice to text software may have been used in the creation of this document   **

## 2023-04-06 VITALS
RESPIRATION RATE: 16 BRPM | HEIGHT: 68 IN | WEIGHT: 196.65 LBS | BODY MASS INDEX: 29.8 KG/M2 | OXYGEN SATURATION: 98 % | DIASTOLIC BLOOD PRESSURE: 92 MMHG | HEART RATE: 101 BPM | TEMPERATURE: 98 F | SYSTOLIC BLOOD PRESSURE: 145 MMHG

## 2023-04-06 PROBLEM — F10.939 ALCOHOL WITHDRAWAL SYNDROME WITH COMPLICATION, WITH UNSPECIFIED COMPLICATION (HCC): Status: RESOLVED | Noted: 2023-04-04 | Resolved: 2023-04-06

## 2023-04-06 LAB
ALBUMIN SERPL BCP-MCNC: 4 G/DL (ref 3.5–5)
ALP SERPL-CCNC: 60 U/L (ref 34–104)
ALT SERPL W P-5'-P-CCNC: 26 U/L (ref 7–52)
ANION GAP SERPL CALCULATED.3IONS-SCNC: 12 MMOL/L (ref 4–13)
AST SERPL W P-5'-P-CCNC: 54 U/L (ref 13–39)
BASOPHILS # BLD AUTO: 0.03 THOUSANDS/ÂΜL (ref 0–0.1)
BASOPHILS NFR BLD AUTO: 1 % (ref 0–1)
BILIRUB SERPL-MCNC: 1.07 MG/DL (ref 0.2–1)
BUN SERPL-MCNC: 7 MG/DL (ref 5–25)
CALCIUM SERPL-MCNC: 9.5 MG/DL (ref 8.4–10.2)
CHLORIDE SERPL-SCNC: 102 MMOL/L (ref 96–108)
CO2 SERPL-SCNC: 25 MMOL/L (ref 21–32)
CREAT SERPL-MCNC: 0.68 MG/DL (ref 0.6–1.3)
EOSINOPHIL # BLD AUTO: 0.1 THOUSAND/ÂΜL (ref 0–0.61)
EOSINOPHIL NFR BLD AUTO: 2 % (ref 0–6)
ERYTHROCYTE [DISTWIDTH] IN BLOOD BY AUTOMATED COUNT: 13.5 % (ref 11.6–15.1)
GFR SERPL CREATININE-BSD FRML MDRD: 109 ML/MIN/1.73SQ M
GLUCOSE SERPL-MCNC: 82 MG/DL (ref 65–140)
HCT VFR BLD AUTO: 38.5 % (ref 36.5–49.3)
HGB BLD-MCNC: 13.1 G/DL (ref 12–17)
IMM GRANULOCYTES # BLD AUTO: 0.01 THOUSAND/UL (ref 0–0.2)
IMM GRANULOCYTES NFR BLD AUTO: 0 % (ref 0–2)
LYMPHOCYTES # BLD AUTO: 1.08 THOUSANDS/ÂΜL (ref 0.6–4.47)
LYMPHOCYTES NFR BLD AUTO: 23 % (ref 14–44)
MAGNESIUM SERPL-MCNC: 2 MG/DL (ref 1.9–2.7)
MCH RBC QN AUTO: 32.6 PG (ref 26.8–34.3)
MCHC RBC AUTO-ENTMCNC: 34 G/DL (ref 31.4–37.4)
MCV RBC AUTO: 96 FL (ref 82–98)
MONOCYTES # BLD AUTO: 0.53 THOUSAND/ÂΜL (ref 0.17–1.22)
MONOCYTES NFR BLD AUTO: 11 % (ref 4–12)
NEUTROPHILS # BLD AUTO: 2.91 THOUSANDS/ÂΜL (ref 1.85–7.62)
NEUTS SEG NFR BLD AUTO: 63 % (ref 43–75)
NRBC BLD AUTO-RTO: 0 /100 WBCS
PLATELET # BLD AUTO: 166 THOUSANDS/UL (ref 149–390)
PMV BLD AUTO: 9.6 FL (ref 8.9–12.7)
POTASSIUM SERPL-SCNC: 3.5 MMOL/L (ref 3.5–5.3)
PROT SERPL-MCNC: 6.6 G/DL (ref 6.4–8.4)
RBC # BLD AUTO: 4.02 MILLION/UL (ref 3.88–5.62)
SODIUM SERPL-SCNC: 139 MMOL/L (ref 135–147)
WBC # BLD AUTO: 4.66 THOUSAND/UL (ref 4.31–10.16)

## 2023-04-06 RX ORDER — ALBUTEROL SULFATE 90 UG/1
2 AEROSOL, METERED RESPIRATORY (INHALATION) EVERY 4 HOURS PRN
Qty: 8 G | Refills: 0 | Status: SHIPPED | OUTPATIENT
Start: 2023-04-06

## 2023-04-06 RX ORDER — TRAZODONE HYDROCHLORIDE 50 MG/1
TABLET ORAL
Qty: 60 TABLET | Refills: 0 | Status: SHIPPED | OUTPATIENT
Start: 2023-04-06

## 2023-04-06 RX ORDER — ESCITALOPRAM OXALATE 10 MG/1
10 TABLET ORAL DAILY
Qty: 30 TABLET | Refills: 0 | Status: SHIPPED | OUTPATIENT
Start: 2023-04-06 | End: 2023-05-06

## 2023-04-06 RX ORDER — BUSPIRONE HYDROCHLORIDE 15 MG/1
15 TABLET ORAL 2 TIMES DAILY
Qty: 60 TABLET | Refills: 0 | Status: SHIPPED | OUTPATIENT
Start: 2023-04-07 | End: 2023-05-07

## 2023-04-06 RX ADMIN — BUSPIRONE HYDROCHLORIDE 15 MG: 10 TABLET ORAL at 07:55

## 2023-04-06 RX ADMIN — NICOTINE 14 MG: 14 PATCH, EXTENDED RELEASE TRANSDERMAL at 08:01

## 2023-04-06 RX ADMIN — ENOXAPARIN SODIUM 40 MG: 40 INJECTION SUBCUTANEOUS at 07:55

## 2023-04-06 RX ADMIN — BUSPIRONE HYDROCHLORIDE 15 MG: 10 TABLET ORAL at 20:31

## 2023-04-06 RX ADMIN — FOLIC ACID: 5 INJECTION, SOLUTION INTRAMUSCULAR; INTRAVENOUS; SUBCUTANEOUS at 07:54

## 2023-04-06 RX ADMIN — NICOTINE POLACRILEX 2 MG: 2 GUM, CHEWING BUCCAL at 20:31

## 2023-04-06 RX ADMIN — ESCITALOPRAM OXALATE 10 MG: 10 TABLET ORAL at 07:55

## 2023-04-06 RX ADMIN — SODIUM CHLORIDE 100 ML/HR: 0.9 INJECTION, SOLUTION INTRAVENOUS at 01:44

## 2023-04-06 NOTE — ASSESSMENT & PLAN NOTE
· Current every day smoker   · Reports h/o wheezing with exertion  · Notes he thinks he may have COPD but has never been formally diagnosed  · Denies acute symptoms- denies SOB, chest pain   · Continue albuterol PRN - Rx provided on discharge  · Encourage smoking cessation  · Recommend outpatient f/u PCP, pulmonology for further work-up

## 2023-04-06 NOTE — PLAN OF CARE
Problem: Nutrition/Hydration-ADULT  Goal: Nutrient/Hydration intake appropriate for improving, restoring or maintaining nutritional needs  Description: Monitor and assess patient's nutrition/hydration status for malnutrition  Collaborate with interdisciplinary team and initiate plan and interventions as ordered  Monitor patient's weight and dietary intake as ordered or per policy  Utilize nutrition screening tool and intervene as necessary  Determine patient's food preferences and provide high-protein, high-caloric foods as appropriate       INTERVENTIONS:  - Monitor oral intake, urinary output, labs, and treatment plans  - Assess nutrition and hydration status and recommend course of action  - Evaluate amount of meals eaten  - Assist patient with eating if necessary   - Allow adequate time for meals  - Recommend/ encourage appropriate diets, oral nutritional supplements, and vitamin/mineral supplements  - Order, calculate, and assess calorie counts as needed  - Recommend, monitor, and adjust tube feedings and TPN/PPN based on assessed needs  - Assess need for intravenous fluids  - Provide specific nutrition/hydration education as appropriate  - Include patient/family/caregiver in decisions related to nutrition  Outcome: Progressing     Problem: PAIN - ADULT  Goal: Verbalizes/displays adequate comfort level or baseline comfort level  Description: Interventions:  - Encourage patient to monitor pain and request assistance  - Assess pain using appropriate pain scale  - Administer analgesics based on type and severity of pain and evaluate response  - Implement non-pharmacological measures as appropriate and evaluate response  - Consider cultural and social influences on pain and pain management  - Notify physician/advanced practitioner if interventions unsuccessful or patient reports new pain  Outcome: Progressing     Problem: INFECTION - ADULT  Goal: Absence or prevention of progression during hospitalization  Description: INTERVENTIONS:  - Assess and monitor for signs and symptoms of infection  - Monitor lab/diagnostic results  - Monitor all insertion sites, i e  indwelling lines, tubes, and drains  - Monitor endotracheal if appropriate and nasal secretions for changes in amount and color  - Conner appropriate cooling/warming therapies per order  - Administer medications as ordered  - Instruct and encourage patient and family to use good hand hygiene technique  - Identify and instruct in appropriate isolation precautions for identified infection/condition  Outcome: Progressing  Goal: Absence of fever/infection during neutropenic period  Description: INTERVENTIONS:  - Monitor WBC    Outcome: Progressing     Problem: SAFETY ADULT  Goal: Patient will remain free of falls  Description: INTERVENTIONS:  - Educate patient/family on patient safety including physical limitations  - Instruct patient to call for assistance with activity   - Consult OT/PT to assist with strengthening/mobility   - Keep Call bell within reach  - Keep bed low and locked with side rails adjusted as appropriate  - Keep care items and personal belongings within reach  - Initiate and maintain comfort rounds  - Make Fall Risk Sign visible to staff  - Apply yellow socks and bracelet for high fall risk patients  - Consider moving patient to room near nurses station  Outcome: Progressing  Goal: Maintain or return to baseline ADL function  Description: INTERVENTIONS:  -  Assess patient's ability to carry out ADLs; assess patient's baseline for ADL function and identify physical deficits which impact ability to perform ADLs (bathing, care of mouth/teeth, toileting, grooming, dressing, etc )  - Assess/evaluate cause of self-care deficits   - Assess range of motion  - Assess patient's mobility; develop plan if impaired  - Assess patient's need for assistive devices and provide as appropriate  - Encourage maximum independence but intervene and supervise when necessary  - Involve family in performance of ADLs  - Assess for home care needs following discharge   - Consider OT consult to assist with ADL evaluation and planning for discharge  - Provide patient education as appropriate  Outcome: Progressing  Goal: Maintains/Returns to pre admission functional level  Description: INTERVENTIONS:  - Perform BMAT or MOVE assessment daily    - Set and communicate daily mobility goal to care team and patient/family/caregiver  - Collaborate with rehabilitation services on mobility goals if consulted  - Out of bed for toileting  - Record patient progress and toleration of activity level   Outcome: Progressing     Problem: DISCHARGE PLANNING  Goal: Discharge to home or other facility with appropriate resources  Description: INTERVENTIONS:  - Identify barriers to discharge w/patient and caregiver  - Arrange for needed discharge resources and transportation as appropriate  - Identify discharge learning needs (meds, wound care, etc )  - Arrange for interpretive services to assist at discharge as needed  - Refer to Case Management Department for coordinating discharge planning if the patient needs post-hospital services based on physician/advanced practitioner order or complex needs related to functional status, cognitive ability, or social support system  Outcome: Progressing     Problem: Knowledge Deficit  Goal: Patient/family/caregiver demonstrates understanding of disease process, treatment plan, medications, and discharge instructions  Description: Complete learning assessment and assess knowledge base    Interventions:  - Provide teaching at level of understanding  - Provide teaching via preferred learning methods  Outcome: Progressing

## 2023-04-06 NOTE — ASSESSMENT & PLAN NOTE
· Pt with a h/o chronic heavy alcohol since age 12  · Reports he was sober for about 4 weeks until early January when he relapsed   · Has been drinking fifth to half gallon of vodka daily since relapse  · Previously on Naltrexone and Vivitrol - reports did not help   · Continue daily thiamine/folic acid supplementation, and MVI  · Consult case management for assistance with aftercare resources - pt interested in outpatient resources with at this time

## 2023-04-06 NOTE — PROGRESS NOTES
"PROGRESS NOTE  DEPARTMENT OF MEDICAL TOXICOLOGY  LEVEL 4 MEDICAL DETOX UNIT  Geovanni [place] 47 y o  male MRN: [id number]  Unit/Bed#: 5T DETOX 505-01 Encounter: [id number]      Reason for Admission/Principal Problem: alcohol withdrawal / alcohol use disorder   Rounding Provider: JESSIE Pepe  Attending Provider: Karime Castillo*   4/4/2023  3:40 PM       * Alcohol use disorder, severe, dependence ([facility] [address] )  Assessment & Plan  · Pt with a h/o chronic heavy alcohol since age 12  · Reports he was sober for about 4 weeks until early January when he relapsed   · Has been drinking fifth to half gallon of vodka daily since relapse  · Previously on Naltrexone and Vivitrol - reports did not help   · Continue IVFs, daily thiamine/folic acid supplementation, and MVI  · Consult case management for assistance with aftercare resources - pt interested in outpatient resources with [id number] at this time    Nodule of chest wall  Assessment & Plan  · Patient states \"I think I may have a hernia\"  · Notes he has had a \"lump\" on chest wall for a few yrs; denies acute symptoms (non-tender)  · On exam: non-erythematous quarter-sized nodule palpated inferior to xiphoid process, non-tender  · No hernia noted on abdominal exam   · Unclear etiology- possibly lipoma  · Recommend outpatient f/u PCP    Elevated lipase  Assessment & Plan  Recent Labs     04/04/23  1609 04/05/23  0523   LIPASE 95* 83*     · In setting of chronic alcohol use  · No acute abdominal pain on exam; tolerating PO intake   · Encourage alcohol cessation     H/O wheezing  Assessment & Plan  · Current every day smoker   · Reports h/o wheezing with exertion  · Notes he thinks he may have COPD but has never been formally diagnosed  · Denies acute symptoms- denies SOB, chest pain   · Continue albuterol PRN  · Encourage smoking cessation  · Recommend outpatient f/u PCP, pulmonology for further work-up     Elevated LFTs  Assessment & Plan  Recent Labs     " 04/06/23  0440   AST 54*   ALT 26   ALKPHOS 60   TBILI 1 07*      · In setting of chronic alcohol use, has improved since admission   · No acute abdominal pain on exam   · Continue to trend LFTs  · Encourage alcohol cessation     Recurrent major depressive disorder, in partial remission ([facility] [address] )  Assessment & Plan  · Patient endorses a h/o ongoing depression  · Current Home medications buspar 15 mg BID, lexapro 10 mg daily, Trazodone 100mg HS PRN  · Reports that he was taking Buspar 15 mg daily as he was only able to afford 30 tabs instead of 60 tabs  · Denies SI/HI/thoughts of self harm  · Continue home medication regimen  · Recommend OP f/u with PCP/OP Psychiatry    Nicotine use disorder  Assessment & Plan  Current every day smoker: 0 5 ppd  Encourage cessation  Offer nicotine replacement    Alcohol withdrawal syndrome with complication, with unspecified complication (HCC)-resolved as of 4/6/2023  Assessment & Plan  · H/o chronic alcohol use; unsure of h/o withdrawal seizures (never formally diagnosed)  · Last drink: 4/4/23   · Tolerated SEWS protocol, received 1170mg phenobarbital without complication  · Withdrawal adequately resolved  · Continue to monitor vitals, symptoms and administer additional phenobarbital as indicated   · Continuous pulse oximetry and telemetry monitoring           VTE Pharmacologic Prophylaxis:   Pharmacologic: Enoxaparin (Lovenox)  Mechanical VTE Prophylaxis in Place: yes    Code Status: Level 1 - Full Code    Patient Centered Rounds: I have performed bedside rounds with nursing staff today  Discussions with Specialists or Other Care Team Provider: case management      Education and Discussions with Family / Patient: patient     Time Spent for Care: 20 minutes  More than 50% of total time spent on counseling and coordination of care as described above      Current Length of Stay: 2 day(s)    Current Patient Status: Inpatient     Certification Statement: The patient will continue to require additional inpatient hospital stay due to ongoing monitoring off of SEWS protocol  Discharge Plan: case management disposition         Subjective:   Patient resting in bed comfortably denies any acute complaints  Endorses improvement in withdrawal symptoms  Tolerating PO, ambulating independently  Objective:     Clinical Opiate Withdrawal Scale  Pulse: 86    SEWS Total Score: 0 (2023 11:00 AM)        Last 24 Hours Medication List:   Current Facility-Administered Medications   Medication Dose Route Frequency Provider Last Rate   • acetaminophen  650 mg Oral Q6H PRN Juliann Zapata PA-C     • acetaminophen  650 mg Oral Q6H PRN Juliann Zapata PA-C     • albuterol  2 puff Inhalation Q4H PRN Eliana BELLO Dawkins     • busPIRone  15 mg Oral BID Eliana Dawkins PA-C     • enoxaparin  40 mg Subcutaneous Daily Juliann Zapata PA-C     • escitalopram  10 mg Oral Daily Eliana Dawkins PA-C     • folic acid 1 mg, thiamine 100 mg in 0 9% sodium chloride 100 mL IVPB   Intravenous Daily Juliann Zapata PA-C 200 mL/hr at 23 9561   • nicotine  14 mg Transdermal Daily Eliana Dawkins PA-C     • nicotine polacrilex  2 mg Oral Q2H PRN Eliana Dawkins PA-C     • ondansetron  4 mg Intravenous Q6H PRN Juliann Zapata PA-C     • traZODone  100 mg Oral HS PRN Eliana Dawkins PA-C           Vitals:   Temp (24hrs), Av 9 °F (36 6 °C), Min:97 2 °F (36 2 °C), Max:98 4 °F (36 9 °C)    Temp:  [97 2 °F (36 2 °C)-98 4 °F (36 9 °C)] 97 2 °F (36 2 °C)  HR:  [77-90] 86  Resp:  [16] 16  BP: (119-159)/() 129/77  SpO2:  [92 %-98 %] 98 %  Body mass index is 29 8 kg/m²  Input and Output Summary (last 24 hours):No intake or output data in the 24 hours ending 23 1703    Physical Exam:   Physical Exam  Vitals and nursing note reviewed  Constitutional:       General: He is not in acute distress  HENT:      Head: Normocephalic        Mouth/Throat: Mouth: Mucous membranes are moist       Pharynx: Oropharynx is clear  Eyes:      Conjunctiva/sclera: Conjunctivae normal       Pupils: Pupils are equal, round, and reactive to light  Cardiovascular:      Rate and Rhythm: Normal rate and regular rhythm  Pulmonary:      Effort: Pulmonary effort is normal  No respiratory distress  Breath sounds: Normal breath sounds  Abdominal:      Palpations: Abdomen is soft  Tenderness: There is no abdominal tenderness  Musculoskeletal:         General: No swelling  Normal range of motion  Cervical back: Normal range of motion  Skin:     General: Skin is warm and dry  Neurological:      General: No focal deficit present  Mental Status: He is alert and oriented to person, place, and time  Mental status is at baseline  Motor: No tremor  Gait: Gait is intact  Psychiatric:         Mood and Affect: Mood normal          Additional Data:     Labs:   Results from last 7 days   Lab Units 04/06/23  0440   WBC Thousand/uL 4 66   HEMOGLOBIN g/dL 13 1   HEMATOCRIT % 38 5   PLATELETS Thousands/uL 166   NEUTROS PCT % 63   LYMPHS PCT % 23   MONOS PCT % 11   EOS PCT % 2      Results from last 7 days   Lab Units 04/06/23  0440   SODIUM mmol/L 139   POTASSIUM mmol/L 3 5   CHLORIDE mmol/L 102   CO2 mmol/L 25   BUN mg/dL 7   CREATININE mg/dL 0 68   ANION GAP mmol/L 12   CALCIUM mg/dL 9 5   ALBUMIN g/dL 4 0   TOTAL BILIRUBIN mg/dL 1 07*   ALK PHOS U/L 60   ALT U/L 26   AST U/L 54*   GLUCOSE RANDOM mg/dL 82                              * I Have Reviewed All Lab Data Listed Above  * Additional Pertinent Lab Tests Reviewed: Timothy 66 Admission Reviewed      Imaging Studies: I have personally reviewed pertinent reports  Recent Cultures (last 7 days): Today, Patient Was Seen By: JESSIE Resendiz    ** Please Note: Dictation voice to text software may have been used in the creation of this document   **

## 2023-04-06 NOTE — CASE MANAGEMENT
Worker consulted with medical provider, pt not medically cleared for discharge today  Worker spoke with pt regarding discharge planning, pt requesting to only follow-up with his PCP at this time  Worker scheduled TCM appointment with PCP for 4/20 at 1430    Pt declined inpatient or outpatient rehab at this time, worker provided resources on AVS

## 2023-04-06 NOTE — ASSESSMENT & PLAN NOTE
Recent Labs     04/06/23  0440   AST 54*   ALT 26   ALKPHOS 60   TBILI 1 07*      · In setting of chronic alcohol use, has improved since admission   · No acute abdominal pain on exam   · Encourage alcohol cessation

## 2023-04-06 NOTE — ASSESSMENT & PLAN NOTE
· Patient endorses a h/o ongoing depression  · Current Home medications buspar 15 mg BID, lexapro 10 mg daily, Trazodone 100mg HS PRN  · Reports that he was taking Buspar 15 mg daily as he was only able to afford 30 tabs instead of 60 tabs  · Denies SI/HI/thoughts of self harm  · Continue home medication regimen on D/C- refills sent to patient's preferred pharmacy  · Recommend OP f/u with PCP/OP Psychiatry

## 2023-04-06 NOTE — ASSESSMENT & PLAN NOTE
Current every day smoker: 0 5 ppd  Encourage cessation  Offered nicotine replacement  Smoking cessation materials provided on d/c

## 2023-04-06 NOTE — ASSESSMENT & PLAN NOTE
· H/o chronic alcohol use; unsure of h/o withdrawal seizures (never formally diagnosed)  · Last drink: 4/4/23   · Tolerated SEWS protocol, received 1170mg phenobarbital without complication  · Withdrawal adequately resolved

## 2023-04-07 NOTE — PLAN OF CARE
Problem: Nutrition/Hydration-ADULT  Goal: Nutrient/Hydration intake appropriate for improving, restoring or maintaining nutritional needs  Description: Monitor and assess patient's nutrition/hydration status for malnutrition  Collaborate with interdisciplinary team and initiate plan and interventions as ordered  Monitor patient's weight and dietary intake as ordered or per policy  Utilize nutrition screening tool and intervene as necessary  Determine patient's food preferences and provide high-protein, high-caloric foods as appropriate       INTERVENTIONS:  - Monitor oral intake, urinary output, labs, and treatment plans  - Assess nutrition and hydration status and recommend course of action  - Evaluate amount of meals eaten  - Assist patient with eating if necessary   - Allow adequate time for meals  - Recommend/ encourage appropriate diets, oral nutritional supplements, and vitamin/mineral supplements  - Order, calculate, and assess calorie counts as needed  - Recommend, monitor, and adjust tube feedings and TPN/PPN based on assessed needs  - Assess need for intravenous fluids  - Provide specific nutrition/hydration education as appropriate  - Include patient/family/caregiver in decisions related to nutrition  Outcome: Progressing     Problem: PAIN - ADULT  Goal: Verbalizes/displays adequate comfort level or baseline comfort level  Description: Interventions:  - Encourage patient to monitor pain and request assistance  - Assess pain using appropriate pain scale  - Administer analgesics based on type and severity of pain and evaluate response  - Implement non-pharmacological measures as appropriate and evaluate response  - Consider cultural and social influences on pain and pain management  - Notify physician/advanced practitioner if interventions unsuccessful or patient reports new pain  Outcome: Progressing     Problem: INFECTION - ADULT  Goal: Absence or prevention of progression during hospitalization  Description: INTERVENTIONS:  - Assess and monitor for signs and symptoms of infection  - Monitor lab/diagnostic results  - Monitor all insertion sites, i e  indwelling lines, tubes, and drains  - Monitor endotracheal if appropriate and nasal secretions for changes in amount and color  - [place] appropriate cooling/warming therapies per order  - Administer medications as ordered  - Instruct and encourage patient and family to use good hand hygiene technique  - Identify and instruct in appropriate isolation precautions for identified infection/condition  Outcome: Progressing  Goal: Absence of fever/infection during neutropenic period  Description: INTERVENTIONS:  - Monitor WBC    Outcome: Progressing     Problem: SAFETY ADULT  Goal: Patient will remain free of falls  Description: INTERVENTIONS:  - Educate patient/family on patient safety including physical limitations  - Instruct patient to call for assistance with activity   - Consult OT/PT to assist with strengthening/mobility   - Keep Call bell within reach  - Keep bed low and locked with side rails adjusted as appropriate  - Keep care items and personal belongings within reach  - Initiate and maintain comfort rounds  - Make Fall Risk Sign visible to staff  - Apply yellow socks and bracelet for high fall risk patients  - Consider moving patient to room near nurses station  Outcome: Progressing  Goal: Maintain or return to baseline ADL function  Description: INTERVENTIONS:  -  Assess patient's ability to carry out ADLs; assess patient's baseline for ADL function and identify physical deficits which impact ability to perform ADLs (bathing, care of mouth/teeth, toileting, grooming, dressing, etc )  - Assess/evaluate cause of self-care deficits   - Assess range of motion  - Assess patient's mobility; develop plan if impaired  - Assess patient's need for assistive devices and provide as appropriate  - Encourage maximum independence but intervene and supervise when necessary  - Involve family in performance of ADLs  - Assess for home care needs following discharge   - Consider OT consult to assist with ADL evaluation and planning for discharge  - Provide patient education as appropriate  Outcome: Progressing  Goal: Maintains/Returns to pre admission functional level  Description: INTERVENTIONS:  - Perform BMAT or MOVE assessment daily    - Set and communicate daily mobility goal to care team and patient/family/caregiver  - Collaborate with rehabilitation services on mobility goals if consulted  - Out of bed for toileting  - Record patient progress and toleration of activity level   Outcome: Progressing     Problem: DISCHARGE PLANNING  Goal: Discharge to home or other facility with appropriate resources  Description: INTERVENTIONS:  - Identify barriers to discharge w/patient and caregiver  - Arrange for needed discharge resources and transportation as appropriate  - Identify discharge learning needs (meds, wound care, etc )  - Arrange for interpretive services to assist at discharge as needed  - Refer to Case Management Department for coordinating discharge planning if the patient needs post-hospital services based on physician/advanced practitioner order or complex needs related to functional status, cognitive ability, or social support system  Outcome: Progressing     Problem: Knowledge Deficit  Goal: Patient/family/caregiver demonstrates understanding of disease process, treatment plan, medications, and discharge instructions  Description: Complete learning assessment and assess knowledge base    Interventions:  - Provide teaching at level of understanding  - Provide teaching via preferred learning methods  Outcome: Progressing

## 2023-04-07 NOTE — UTILIZATION REVIEW
NOTIFICATION OF ADMISSION DISCHARGE   This is a Notification of Discharge from [address]  Please be advised that this patient has been discharge from our facility  Below you will find the admission and discharge date and time including the patient’s disposition  UTILIZATION REVIEW CONTACT:  Ghulam Mullen MA  Utilization   Network Utilization Review Department  Phone: [phone] x carefully listen to the prompts  All voicemails are confidential   Email: [email or web address]     ADMISSION INFORMATION  PRESENTATION DATE: 4/4/2023  3:40 PM  OBERVATION ADMISSION DATE:   INPATIENT ADMISSION DATE: 4/4/23  4:41 PM   DISCHARGE DATE: 4/6/2023 10:17 PM   DISPOSITION:Home/Self Care    IMPORTANT INFORMATION:  Send all requests for admission clinical reviews, approved or denied determinations and any other requests to dedicated fax number below belonging to the campus where the patient is receiving treatment   List of dedicated fax numbers:  [address] DENIALS (Administrative/Medical Necessity) [phone]   [address] (Maternity/NICU/Pediatrics) [phone]   [facility] [phone]   [place] [address] [phone]   [phone]   220 [facility] [phone] 90 [facility] [phone]   [address] [place] [address] 119 [phone]   [facility]  [phone]   [address] [facility] [phone]   412 [facility] [address] [facility] [phone]

## 2023-04-07 NOTE — CASE MANAGEMENT
Worker was informed pt left yesterday evening, declined inpatient and outpatient rehab services  Pt did have PCP appointment scheduled for 4/20 at 1430

## 2023-04-07 NOTE — PLAN OF CARE
Problem: Nutrition/Hydration-ADULT  Goal: Nutrient/Hydration intake appropriate for improving, restoring or maintaining nutritional needs  Description: Monitor and assess patient's nutrition/hydration status for malnutrition  Collaborate with interdisciplinary team and initiate plan and interventions as ordered  Monitor patient's weight and dietary intake as ordered or per policy  Utilize nutrition screening tool and intervene as necessary  Determine patient's food preferences and provide high-protein, high-caloric foods as appropriate       INTERVENTIONS:  - Monitor oral intake, urinary output, labs, and treatment plans  - Assess nutrition and hydration status and recommend course of action  - Evaluate amount of meals eaten  - Assist patient with eating if necessary   - Allow adequate time for meals  - Recommend/ encourage appropriate diets, oral nutritional supplements, and vitamin/mineral supplements  - Order, calculate, and assess calorie counts as needed  - Recommend, monitor, and adjust tube feedings and TPN/PPN based on assessed needs  - Assess need for intravenous fluids  - Provide specific nutrition/hydration education as appropriate  - Include patient/family/caregiver in decisions related to nutrition  Outcome: Progressing     Problem: PAIN - ADULT  Goal: Verbalizes/displays adequate comfort level or baseline comfort level  Description: Interventions:  - Encourage patient to monitor pain and request assistance  - Assess pain using appropriate pain scale  - Administer analgesics based on type and severity of pain and evaluate response  - Implement non-pharmacological measures as appropriate and evaluate response  - Consider cultural and social influences on pain and pain management  - Notify physician/advanced practitioner if interventions unsuccessful or patient reports new pain  Outcome: Progressing     Problem: INFECTION - ADULT  Goal: Absence or prevention of progression during hospitalization  Description: INTERVENTIONS:  - Assess and monitor for signs and symptoms of infection  - Monitor lab/diagnostic results  - Monitor all insertion sites, i e  indwelling lines, tubes, and drains  - Monitor endotracheal if appropriate and nasal secretions for changes in amount and color  - [place] appropriate cooling/warming therapies per order  - Administer medications as ordered  - Instruct and encourage patient and family to use good hand hygiene technique  - Identify and instruct in appropriate isolation precautions for identified infection/condition  Outcome: Progressing  Goal: Absence of fever/infection during neutropenic period  Description: INTERVENTIONS:  - Monitor WBC    Outcome: Progressing     Problem: SAFETY ADULT  Goal: Patient will remain free of falls  Description: INTERVENTIONS:  - Educate patient/family on patient safety including physical limitations  - Instruct patient to call for assistance with activity   - Consult OT/PT to assist with strengthening/mobility   - Keep Call bell within reach  - Keep bed low and locked with side rails adjusted as appropriate  - Keep care items and personal belongings within reach  - Initiate and maintain comfort rounds  - Make Fall Risk Sign visible to staff  - Apply yellow socks and bracelet for high fall risk patients  - Consider moving patient to room near nurses station  Outcome: Progressing  Goal: Maintain or return to baseline ADL function  Description: INTERVENTIONS:  -  Assess patient's ability to carry out ADLs; assess patient's baseline for ADL function and identify physical deficits which impact ability to perform ADLs (bathing, care of mouth/teeth, toileting, grooming, dressing, etc )  - Assess/evaluate cause of self-care deficits   - Assess range of motion  - Assess patient's mobility; develop plan if impaired  - Assess patient's need for assistive devices and provide as appropriate  - Encourage maximum independence but intervene and supervise when necessary  - Involve family in performance of ADLs  - Assess for home care needs following discharge   - Consider OT consult to assist with ADL evaluation and planning for discharge  - Provide patient education as appropriate  Outcome: Progressing  Goal: Maintains/Returns to pre admission functional level  Description: INTERVENTIONS:  - Perform BMAT or MOVE assessment daily    - Set and communicate daily mobility goal to care team and patient/family/caregiver  - Collaborate with rehabilitation services on mobility goals if consulted  - Out of bed for toileting  - Record patient progress and toleration of activity level   Outcome: Progressing     Problem: DISCHARGE PLANNING  Goal: Discharge to home or other facility with appropriate resources  Description: INTERVENTIONS:  - Identify barriers to discharge w/patient and caregiver  - Arrange for needed discharge resources and transportation as appropriate  - Identify discharge learning needs (meds, wound care, etc )  - Arrange for interpretive services to assist at discharge as needed  - Refer to Case Management Department for coordinating discharge planning if the patient needs post-hospital services based on physician/advanced practitioner order or complex needs related to functional status, cognitive ability, or social support system  Outcome: Progressing     Problem: Knowledge Deficit  Goal: Patient/family/caregiver demonstrates understanding of disease process, treatment plan, medications, and discharge instructions  Description: Complete learning assessment and assess knowledge base    Interventions:  - Provide teaching at level of understanding  - Provide teaching via preferred learning methods  Outcome: Progressing

## 2023-04-07 NOTE — DISCHARGE SUMMARY
"[facility]  Discharge- Soraida Mims 1970, 47 y o  male MRN: [id number]  Unit/Bed#: 5T DETOX 505-01 Encounter: [id number]  Primary Care Provider: Bob Rodgers MD   Date and time admitted to hospital: 4/4/2023  3:40 PM    Nodule of chest wall  Assessment & Plan  · Patient states \"I think I may have a hernia\"  · Notes he has had a \"lump\" on chest wall for a few yrs; denies acute symptoms (non-tender)  · On exam: non-erythematous quarter-sized nodule palpated inferior to xiphoid process, non-tender  · No hernia noted on abdominal exam   · Unclear etiology- possibly lipoma  · Recommend outpatient f/u PCP    Elevated lipase  Assessment & Plan  Recent Labs     04/04/23  1609 04/05/23  0523   LIPASE 95* 83*     · In setting of chronic alcohol use  · No acute abdominal pain on exam; tolerating PO intake   · Encourage alcohol cessation     H/O wheezing  Assessment & Plan  · Current every day smoker   · Reports h/o wheezing with exertion  · Notes he thinks he may have COPD but has never been formally diagnosed  · Denies acute symptoms- denies SOB, chest pain   · Continue albuterol PRN - Rx provided on discharge  · Encourage smoking cessation  · Recommend outpatient f/u PCP, pulmonology for further work-up     Elevated LFTs  Assessment & Plan  Recent Labs     04/06/23  0440   AST 54*   ALT 26   ALKPHOS 60   TBILI 1 07*      · In setting of chronic alcohol use, has improved since admission   · No acute abdominal pain on exam   · Encourage alcohol cessation     Recurrent major depressive disorder, in partial remission ([facility] [address] )  Assessment & Plan  · Patient endorses a h/o ongoing depression  · Current Home medications buspar 15 mg BID, lexapro 10 mg daily, Trazodone 100mg HS PRN  · Reports that he was taking Buspar 15 mg daily as he was only able to afford 30 tabs instead of 60 tabs  · Denies SI/HI/thoughts of self harm  · Continue home medication regimen on D/C- refills sent to patient's " preferred pharmacy  · Recommend OP f/u with PCP/OP Psychiatry    Nicotine use disorder  Assessment & Plan  Current every day smoker: 0 5 ppd  Encourage cessation  Offered nicotine replacement  Smoking cessation materials provided on d/c    * Alcohol use disorder, severe, dependence ([facility] [address] )  Assessment & Plan  · Pt with a h/o chronic heavy alcohol since age 12  · Reports he was sober for about 4 weeks until early January when he relapsed   · Has been drinking fifth to half gallon of vodka daily since relapse  · Previously on Naltrexone and Vivitrol - reports did not help   · Continue daily thiamine/folic acid supplementation, and MVI  · Consult case management for assistance with aftercare resources - pt interested in outpatient resources with at this time    Alcohol withdrawal syndrome with complication, with unspecified complication (HCC)-resolved as of 4/6/2023  Assessment & Plan  · H/o chronic alcohol use; unsure of h/o withdrawal seizures (never formally diagnosed)  · Last drink: 4/4/23   · Tolerated SEWS protocol, received 1170mg phenobarbital without complication  · Withdrawal adequately resolved  Discharging Physician / Practitioner: Ángela Bay PA-C  PCP: Jailyn Sauer MD  Admission Date:   Admission Orders (From admission, onward)     Ordered        04/04/23 1641  INPATIENT ADMISSION  Once                      Discharge Date: 04/07/23    Medical Problems     Resolved Problems  Date Reviewed: 4/6/2023          Resolved    Alcohol withdrawal syndrome with complication, with unspecified complication ([facility] ) 5/2/9768     Resolved by  Joe Frankel, [address] [place] [address] Stay:  · Case management    Procedures Performed:   · None     Significant Findings / Test Results:   · Elevated lipase-improved  · Elevated LFTs-improved    Incidental Findings:   · None    Test Results Pending at Discharge (will require follow up):    · None     Outpatient Tests / Follow-up Requested:  · Recommend f/u with PCP within 1-2 weeks of d/c  · Recommend outpatient follow-up with pulmonology    Complications:  none    Reason for Admission: Alcohol withdrawal, alcohol use disorder    Hospital Course:     Meir Tinoco is a 47 y o  male patient who originally presented to the hospital on 4/4/2023 due to alcohol withdrawal  Patient initially presented to the [facility] ED requesting detoxification from alcohol  Patient was admitted to the [facility] medical detox unit under [facility] protocol for medically assisted alcohol withdrawal and received a total of 1170 mg phenobarbital without complication  Patient's alcohol withdrawal symptoms subsequently resolved, and he has remained without objective evidence of alcohol withdrawal at this time  During this hospitalization, patient was found to have elevated lipase and LFTs, which improved with IVFs and alcohol abstinence  Case management was consulted for assistance with aftercare resources, and patient will be discharged home with OP PCP follow up only per his request  Patient has been medically stabilized from a withdrawal perspective  He requested to go home on the evening of 4/6/23 and was discharged without issue in stable condition, ambulated off the unit escorted by RN  Please see above list of diagnoses and related plan for additional information  Condition at Discharge: stable     Discharge Day Visit / Exam:     * Please refer to separate progress note for these details *    Discussion with Family: I personally did not discuss this case with the patient's family  I reviewed the discharge plan with the patient and answered all questions to the best of my ability  Discharge instructions/Information to patient and family:   See after visit summary for information provided to patient and family  Provisions for Follow-Up Care:  See after visit summary for information related to follow-up care and any pertinent home health orders  Disposition:     Home    For Discharges to [facility] SNF:   · Not Applicable to this Patient - Not Applicable to this Patient    Planned Readmission: N/A     Discharge Statement:  I spent 20 minutes discharging the patient  This time was spent on the day of discharge  I had direct contact with the patient on the day of discharge  Greater than 50% of the total time was spent examining patient, answering all patient questions, arranging and discussing plan of care with patient as well as directly providing post-discharge instructions  Additional time then spent on discharge activities  Discharge Medications:  See after visit summary for reconciled discharge medications provided to patient and family        ** Please Note: This note has been constructed using a voice recognition system **

## 2023-04-07 NOTE — NURSING NOTE
Patient being discharged this evening  Belongings returned and IV's removed  Discharge educations and AVS provided  Patient escorted to entrance to meet significant other by RN

## 2023-04-24 ENCOUNTER — TELEPHONE (OUTPATIENT)
Dept: PSYCHIATRY | Facility: CLINIC | Age: 53
End: 2023-04-24

## 2023-04-24 ENCOUNTER — PATIENT OUTREACH (OUTPATIENT)
Dept: FAMILY MEDICINE CLINIC | Facility: CLINIC | Age: 53
End: 2023-04-24

## 2023-04-24 NOTE — TELEPHONE ENCOUNTER
----- Message from Nicky Phillip sent at 4/24/2023  8:24 AM EDT -----  Sofie Curtis,  As discussed on phone, please assist with [facility] [place], as I am not able to place referral for this patient  Thank you    Nicky Phillip

## 2023-04-24 NOTE — PROGRESS NOTES
Call received from [place], [place] with [employer]  Nikolai Souza assisted with referral and stated their schedule is closed to new patients at this time but they can add patient to waitlist   Nikolai Souza will call patient to inform of the same as well      Nikolai Souza provided alternate treatment program information if patient prefers to look for other supports:    [facility] [phone]  [facility] [address] [phone]    Will follow-up with patient after [address] him to inform of waitlist

## 2023-04-26 ENCOUNTER — PATIENT OUTREACH (OUTPATIENT)
Dept: FAMILY MEDICINE CLINIC | Facility: CLINIC | Age: 53
End: 2023-04-26

## 2023-04-26 NOTE — PROGRESS NOTES
Call placed to patient to check status and inform that the [facility] attempted to reach him to place on waitlist for outpatient treatment services; they left a voicemail for patient  Spoke with patient who did not check messages but will check and return call to the AV[facility] to discuss services  Will follow-up with patient in roughly 1 week to ensure he was able to speak with [facility] Staff regarding outpatient treatment services

## 2023-04-29 ENCOUNTER — HOSPITAL ENCOUNTER (EMERGENCY)
Facility: HOSPITAL | Age: 53
Discharge: HOME | End: 2023-04-29
Attending: EMERGENCY MEDICINE

## 2023-04-29 ENCOUNTER — TELEPHONE (OUTPATIENT)
Dept: OTHER | Facility: OTHER | Age: 53
End: 2023-04-29

## 2023-04-29 VITALS
HEART RATE: 112 BPM | WEIGHT: 185.85 LBS | DIASTOLIC BLOOD PRESSURE: 120 MMHG | BODY MASS INDEX: 28.17 KG/M2 | OXYGEN SATURATION: 95 % | TEMPERATURE: 97.6 F | RESPIRATION RATE: 18 BRPM | SYSTOLIC BLOOD PRESSURE: 158 MMHG

## 2023-04-29 DIAGNOSIS — F10.929 ALCOHOL INTOXICATION (HCC): Primary | ICD-10-CM

## 2023-04-29 RX ORDER — GABAPENTIN 300 MG/1
300 CAPSULE ORAL ONCE
Status: COMPLETED | OUTPATIENT
Start: 2023-04-29 | End: 2023-04-29

## 2023-04-29 RX ORDER — GABAPENTIN 100 MG/1
CAPSULE ORAL
Qty: 36 CAPSULE | Refills: 0 | Status: SHIPPED | OUTPATIENT
Start: 2023-04-29 | End: 2023-05-05

## 2023-04-29 RX ORDER — LORAZEPAM 1 MG/1
2 TABLET ORAL ONCE
Status: COMPLETED | OUTPATIENT
Start: 2023-04-29 | End: 2023-04-29

## 2023-04-29 RX ADMIN — GABAPENTIN 300 MG: 300 CAPSULE ORAL at 22:15

## 2023-04-29 RX ADMIN — LORAZEPAM 2 MG: 1 TABLET ORAL at 21:41

## 2023-04-30 NOTE — ED NOTES
Voicemail left requesting HOST assessment  Crisis to follow up and send clinical for bed search once UDS/Covid are resulted

## 2023-04-30 NOTE — ED PROVIDER NOTES
History  Chief Complaint   Patient presents with    Detox Evaluation     States he wants detox from alcohol, drinks 1 bottle of vodka/day  Last drink 5 mins pta  Denies drug use  46 YOM with PMH depression, nicotine abuse, chronic alcohol abuse presents today for detox/rehab  Was admitted here for detox 4/4-4/6  Started drinking again on 4/8  Saw PCP on 4/20 and was offered HOST but denied, referral placed to detox  Apparently [facility] has been trying to get in contact with him but unable to reach him  Reports he drinks 1 bottle of vodka per day  Last drink was 5min PTA  Denies any history of withdrawal seizures  States he just feels little anxious right now  Is accompanied by family members  Denies SI/HI  No hallucinations  Prior to Admission Medications   Prescriptions Last Dose Informant Patient Reported? Taking? albuterol (PROVENTIL HFA,VENTOLIN HFA) 90 mcg/act inhaler   No No   Sig: Inhale 2 puffs every 4 (four) hours as needed for wheezing or shortness of breath   busPIRone (BUSPAR) 15 mg tablet   No No   Sig: Take 1 tablet (15 mg total) by mouth 2 (two) times a day Do not start before April 7, 2023  escitalopram (LEXAPRO) 10 mg tablet   No No   Sig: Take 1 tablet (10 mg total) by mouth daily   naltrexone (REVIA) 50 mg tablet   Yes No   Sig: Take 50 mg by mouth daily   traZODone (DESYREL) 50 mg tablet   No No   Sig: One-2 tablets at bedtime as needed for sleep      Facility-Administered Medications: None       Past Medical History:   Diagnosis Date    Alcohol addiction ([facility] ) 12/14/2015    Anxiety 12/14/2022    Nicotine use disorder 12/14/2022    Recurrent major depressive disorder, in partial remission ([facility] ) 12/14/2022       History reviewed  No pertinent surgical history  History reviewed  No pertinent family history  I have reviewed and agree with the history as documented      E-Cigarette/Vaping    E-Cigarette Use Never User      E-Cigarette/Vaping Substances    Nicotine No  THC No     CBD No     Flavoring No     Other No     Unknown No      Social History     Tobacco Use    Smoking status: Every Day     Types: Cigarettes    Smokeless tobacco: Never   Vaping Use    Vaping Use: Never used   Substance Use Topics    Alcohol use: Yes     Comment: 1 bottle of vodka/ day    Drug use: Not Currently       Review of Systems   Psychiatric/Behavioral: The patient is nervous/anxious  All other systems reviewed and are negative  Physical Exam  Physical Exam  Vitals and nursing note reviewed  Constitutional:       General: He is not in acute distress  Appearance: Normal appearance  He is well-developed  He is not ill-appearing  HENT:      Head: Normocephalic and atraumatic  Eyes:      Conjunctiva/sclera: Conjunctivae normal    Cardiovascular:      Rate and Rhythm: Normal rate  Pulmonary:      Effort: Pulmonary effort is normal    Abdominal:      Palpations: Abdomen is soft  Musculoskeletal:         General: Normal range of motion  Cervical back: Normal range of motion and neck supple  Skin:     General: Skin is warm and dry  Capillary Refill: Capillary refill takes less than 2 seconds  Neurological:      Mental Status: He is alert and oriented to person, place, and time     Psychiatric:         Mood and Affect: Mood normal          Behavior: Behavior normal          Vital Signs  ED Triage Vitals [04/29/23 2122]   Temperature Pulse Respirations Blood Pressure SpO2   97 6 °F (36 4 °C) (!) 112 18 (!) 158/120 95 %      Temp Source Heart Rate Source Patient Position - Orthostatic VS BP Location FiO2 (%)   Tympanic Monitor Sitting Left arm --      Pain Score       --           Vitals:    04/29/23 2122   BP: (!) 158/120   Pulse: (!) 112   Patient Position - Orthostatic VS: Sitting         Visual Acuity      ED Medications  Medications   LORazepam (ATIVAN) tablet 2 mg (2 mg Oral Given 4/29/23 2141)   gabapentin (NEURONTIN) capsule 300 mg (300 mg Oral Given 4/29/23 2215)       Diagnostic Studies  Results Reviewed     Procedure Component Value Units Date/Time    FLU/RSV/COVID - if FLU/RSV clinically relevant [[id number]]     Lab Status: No result Specimen: Nares from Nose     Rapid drug screen, urine [[id number]]     Lab Status: No result Specimen: Urine                  No orders to display              Procedures  Procedures         ED Course  ED Course as of 04/29/23 2222   Sat Apr 29, 2023   2204 Pt does not wish to wait for HOST  States he has an appt on Tuesday with PCP and would like to talk to them  Is willing to do the gabapentin with taper to help with withdrawal at home                                              Medical Decision Making  46 YOM with PMH depression, nicotine abuse, chronic alcohol abuse presents today for detox/rehab  States he has been drinking 1 bottle of vodka every day since 4/8, was discharged from detox on 4/6  Last drink 5 min PTA  Initial plan was to have pt talk to HOST however as time went on pt then eventually refused  Stated that he would like to talk to his PCP on Tuesday  Discussed gabapentin to help pt withdrawal  Pt is agreeable  I have discussed the plan to discharge pt from ED  The patient was discharged in stable condition   Patient ambulated off the department   Extensive return to emergency department precautions were discussed   Follow up with appropriate providers including primary care physician was discussed   Patient and/or their  primary decision maker expressed understanding  Rashel Elias remained stable during entire emergency department stay  Alcohol intoxication ([facility] ): acute illness or injury  Amount and/or Complexity of Data Reviewed  External Data Reviewed: notes  Details: previous ED visit for detox, previous PCP visit   Labs: ordered  Risk  Prescription drug management            Disposition  Final diagnoses:   Alcohol intoxication ([address] )     Time reflects when diagnosis was documented in both MDM as applicable and the Disposition within this note     Time User Action Codes Description Comment    4/29/2023 10:04 PM Renu Qiu Add [F10 599] Alcohol intoxication [facility])       ED Disposition     ED Disposition   Discharge    Condition   Stable    Date/Time   Sat Apr 29, 2023 10:06 PM    Comment   Melanie Balloon discharge to home/self care  Follow-up Information    None         Patient's Medications   Discharge Prescriptions    GABAPENTIN (NEURONTIN) 100 MG CAPSULE    Take 3 capsules (300 mg total) by mouth 3 (three) times a day for 2 days, THEN 2 capsules (200 mg total) 3 (three) times a day for 2 days, THEN 1 capsule (100 mg total) 3 (three) times a day for 2 days  Start Date: 4/29/2023 End Date: 5/5/2023       Order Dose: --       Quantity: 36 capsule    Refills: 0       No discharge procedures on file      PDMP Review       Value Time User    PDMP Reviewed  Yes 4/4/2023  1:47 PM Melvi Carrillo MD          ED Provider  Electronically Signed by           Patrizia Reyez PA-C  04/29/23 2224

## 2023-05-03 ENCOUNTER — PATIENT OUTREACH (OUTPATIENT)
Dept: FAMILY MEDICINE CLINIC | Facility: CLINIC | Age: 53
End: 2023-05-03

## 2023-05-03 NOTE — PROGRESS NOTES
Chart reviewed  Patient went to ED on 4/29 related to alcohol intoxication  Patient initially agreed to speak with HOST rep but then refused  Patient's appt with PCP yesterday was cancelled and rescheduled for 5/16  Call placed to patient to check status and follow-up on [facility] Referral for Outpatient Treatment Services  No answer, voicemail left, and awaiting return call

## 2023-05-10 ENCOUNTER — PATIENT OUTREACH (OUTPATIENT)
Dept: FAMILY MEDICINE CLINIC | Facility: CLINIC | Age: 53
End: 2023-05-10

## 2023-05-10 NOTE — PROGRESS NOTES
2nd outreach attempt to patient to check status and follow-up on [facility] Referral for Outpatient Treatment Services  Patient has upcoming PCP appt on 5/16  Spoke with patient who states he plans to discuss [address] with PCP on 5/16 and agreed for OP SWCM to follow-up after this appt  Patient appreciative for OP SWCM's outreach

## 2023-05-10 NOTE — PROGRESS NOTES
Call received from patient stating he would like to go to inpatient treatment at [facility] as soon as possible  Patient denies any suicidal ideations or need for crisis intervention  OP SWCM will outreach [employer] on [address]  Spoke with Zbigniew Cameron through [address] [phone]  Informed patient is requesting inpatient treatment at [facility] as soon as possible  Patient was most recently with [employer] in April of this year  Conference call made with Zbigniew Cameron and patient to update assessment  Assessment completed  Zbigniew Cameron will send information to [facility] facility for review and they will return call to patient in roughly 1 hour    Patient aware to wait for call from intake team

## 2023-05-11 ENCOUNTER — PATIENT OUTREACH (OUTPATIENT)
Dept: FAMILY MEDICINE CLINIC | Facility: CLINIC | Age: 53
End: 2023-05-11

## 2023-05-11 NOTE — PROGRESS NOTES
Call placed to [facility] Intake to check status of patient's assessment last night and to ask if admission to inpatient treatment program occurred  Spoke with Intake Rep who confirmed [facility] [place] [place] [facility] for inpatient treatment  [facility] [place] roughly 2 weeks to check status

## 2023-05-15 ENCOUNTER — PATIENT OUTREACH (OUTPATIENT)
Dept: FAMILY MEDICINE CLINIC | Facility: CLINIC | Age: 53
End: 2023-05-15

## 2023-05-15 NOTE — PROGRESS NOTES
Call placed to [facility] Admissions [phone] to ask if patient is still at [facility] location for inpatient treatment  Unable to reach rep or leave voicemail after 25 minute hold  Call placed to patient who stated he just returned home today from [facility] after completing his detox  He would like to [facility] [address] [facility] ([facility]  He has been to this treatment program in the past   He agreed for OP [facility] to outreach [address] on his behalf  Patient also confirmed he will be at PCP appt tomorrow  Call placed to PCP Office to confirm that patient will be at tomorrow's appt at   [address] left for office  Spoke with Mae Bernal at [address] [phone] who stated they only accept commercial insurance but will speak with supervisor since patient was at their treatment program in the past   Mae Bernal will call patient this afternoon  Informed patient who will await call from Mae Bernal

## 2023-05-16 ENCOUNTER — OFFICE VISIT (OUTPATIENT)
Dept: FAMILY MEDICINE CLINIC | Facility: CLINIC | Age: 53
End: 2023-05-16

## 2023-05-16 VITALS
OXYGEN SATURATION: 97 % | HEIGHT: 68 IN | HEART RATE: 106 BPM | WEIGHT: 181.8 LBS | DIASTOLIC BLOOD PRESSURE: 82 MMHG | RESPIRATION RATE: 16 BRPM | TEMPERATURE: 98.2 F | SYSTOLIC BLOOD PRESSURE: 102 MMHG | BODY MASS INDEX: 27.55 KG/M2

## 2023-05-16 DIAGNOSIS — F10.20 UNCOMPLICATED ALCOHOL DEPENDENCE (HCC): Primary | ICD-10-CM

## 2023-05-16 DIAGNOSIS — Z87.898 H/O WHEEZING: ICD-10-CM

## 2023-05-16 DIAGNOSIS — F33.41 RECURRENT MAJOR DEPRESSIVE DISORDER, IN PARTIAL REMISSION (HCC): ICD-10-CM

## 2023-05-16 DIAGNOSIS — F90.0 ATTENTION DEFICIT HYPERACTIVITY DISORDER (ADHD), PREDOMINANTLY INATTENTIVE TYPE: ICD-10-CM

## 2023-05-16 DIAGNOSIS — F41.9 ANXIETY: ICD-10-CM

## 2023-05-16 RX ORDER — BUSPIRONE HYDROCHLORIDE 30 MG/1
30 TABLET ORAL 2 TIMES DAILY
Qty: 180 TABLET | Refills: 5 | Status: SHIPPED | OUTPATIENT
Start: 2023-05-16

## 2023-05-16 RX ORDER — DEXTROAMPHETAMINE SACCHARATE, AMPHETAMINE ASPARTATE, DEXTROAMPHETAMINE SULFATE AND AMPHETAMINE SULFATE 2.5; 2.5; 2.5; 2.5 MG/1; MG/1; MG/1; MG/1
10 TABLET ORAL DAILY
Qty: 30 TABLET | Refills: 0 | Status: SHIPPED | OUTPATIENT
Start: 2023-05-16

## 2023-05-16 RX ORDER — ESCITALOPRAM OXALATE 10 MG/1
10 TABLET ORAL DAILY
Qty: 90 TABLET | Refills: 1 | Status: SHIPPED | OUTPATIENT
Start: 2023-05-16 | End: 2023-06-15

## 2023-05-16 NOTE — PATIENT INSTRUCTIONS
Now at day 6 of his recovery  Will be attending outpatient care through [facility]  They will supply Vivitrol for him  Continue 10 mg of Lexapro daily  Increase buspirone to 30 mg twice daily for anxiety  Suspect he struggles with ADHD with his inability to focus or stay on task  Trial of amphetamine 10 mg daily in the morning  Follow-up in 1 month

## 2023-05-16 NOTE — PROGRESS NOTES
"Chief Complaint   Patient presents with   • Follow-up        HPI   Here for follow-up of alcoholism  Just returned from [facility] where he spent 5 days doing detox  He is entered into [facility]  Intensive outpatient 3 days a week, 4 hours a day  He can get Vivitrol for free there  Continues on Lexapro and buspirone  Continues with lack of focus  Was noted when he saw Dr Buffy Ramírez on 4/20  Notes he has been scatterbrained for the last 5 years  Notes the Ativan worked well to help him through detox  Past Medical History:   Diagnosis Date   • Alcohol addiction ([facility] ) 12/14/2015   • Anxiety 12/14/2022   • Nicotine use disorder 12/14/2022   • Recurrent major depressive disorder, in partial remission ([facility] ) 12/14/2022        History reviewed  No pertinent surgical history  Social History     Tobacco Use   • Smoking status: Every Day     Types: Cigarettes   • Smokeless tobacco: Never   Substance Use Topics   • Alcohol use: Yes     Comment: 1 bottle of vodka/ day       Social History     Social History Narrative    With Patti since 2003  Ended Feb 2023 because of his drinking  5-year marriage previously  2 children from first marriage  Is a   Enjoys boating  The following portions of the patient's history were reviewed and updated as appropriate: allergies, current medications, past family history, past medical history, past social history, past surgical history and problem list       Review of Systems       /82 (BP Location: Left arm, Patient Position: Sitting, Cuff Size: Large)   Pulse (!) 106   Temp 98 2 °F (36 8 °C) (Temporal)   Resp 16   Ht 5' 8 11\" (1 73 m)   Wt 82 5 kg (181 lb 12 8 oz)   SpO2 97%   BMI 27 55 kg/m²      Physical Exam   Appears well  No tremor noted  Interacts appropriately                Current Outpatient Medications:   •  albuterol (PROVENTIL HFA,VENTOLIN HFA) 90 mcg/act inhaler, Inhale 2 puffs every 4 (four) hours as needed for wheezing " or shortness of breath, Disp: 8 g, Rfl: 0  •  amphetamine-dextroamphetamine (ADDERALL, 10MG,) 10 mg tablet, Take 1 tablet (10 mg total) by mouth daily Max Daily Amount: 10 mg, Disp: 30 tablet, Rfl: 0  •  busPIRone (BUSPAR) 30 MG tablet, Take 1 tablet (30 mg total) by mouth 2 (two) times a day, Disp: 180 tablet, Rfl: 5  •  escitalopram (LEXAPRO) 10 mg tablet, Take 1 tablet (10 mg total) by mouth daily, Disp: 90 tablet, Rfl: 1  •  traZODone (DESYREL) 50 mg tablet, One-2 tablets at bedtime as needed for sleep, Disp: 60 tablet, Rfl: 0     No problem-specific Assessment & Plan notes found for this encounter  Diagnoses and all orders for this visit:    Uncomplicated alcohol dependence ([address] )    Anxiety  -     busPIRone (BUSPAR) 30 MG tablet; Take 1 tablet (30 mg total) by mouth 2 (two) times a day    Recurrent major depressive disorder, in partial remission (HCC)  -     escitalopram (LEXAPRO) 10 mg tablet; Take 1 tablet (10 mg total) by mouth daily    Attention deficit hyperactivity disorder (ADHD), predominantly inattentive type  -     amphetamine-dextroamphetamine (ADDERALL, 10MG,) 10 mg tablet; Take 1 tablet (10 mg total) by mouth daily Max Daily Amount: 10 mg        Patient Instructions   Now at day 6 of his recovery  Will be attending outpatient care through [facility]  They will supply Vivitrol for him  Continue 10 mg of Lexapro daily  Increase buspirone to 30 mg twice daily for anxiety  Suspect he struggles with ADHD with his inability to focus or stay on task  Trial of amphetamine 10 mg daily in the morning  Follow-up in 1 month

## 2023-05-17 RX ORDER — ALBUTEROL SULFATE 90 UG/1
2 AEROSOL, METERED RESPIRATORY (INHALATION) EVERY 4 HOURS PRN
Qty: 8 G | Refills: 5 | Status: SHIPPED | OUTPATIENT
Start: 2023-05-17

## 2023-05-23 ENCOUNTER — TELEPHONE (OUTPATIENT)
Dept: FAMILY MEDICINE CLINIC | Facility: CLINIC | Age: 53
End: 2023-05-23

## 2023-05-23 ENCOUNTER — PATIENT OUTREACH (OUTPATIENT)
Dept: FAMILY MEDICINE CLINIC | Facility: CLINIC | Age: 53
End: 2023-05-23

## 2023-05-23 DIAGNOSIS — F17.200 NICOTINE USE DISORDER: Primary | ICD-10-CM

## 2023-05-23 NOTE — PROGRESS NOTES
Chart reviewed  Patient informed PCP that he started IOP through [id number] (3 days per week for 4 hour sessions)  Call placed to patient to follow-up and assess any additional needs  No answer, voicemail left, and awaiting return call

## 2023-05-23 NOTE — TELEPHONE ENCOUNTER
Nicorette sent to drugstore  Advise that the proper way to use it is to take a couple chews and then park it on the side of the mouth

## 2023-05-31 ENCOUNTER — PATIENT OUTREACH (OUTPATIENT)
Dept: FAMILY MEDICINE CLINIC | Facility: CLINIC | Age: 53
End: 2023-05-31

## 2023-05-31 NOTE — PROGRESS NOTES
2nd outreach attempt to patient to check status and follow-up in IOP through [address]  Patient states things are going great and he is continuing with IOP at [address]  Patient denied any additional needs at this time; was at work  Will close case but remain available to assist with any future needs

## 2023-06-12 DIAGNOSIS — F90.0 ATTENTION DEFICIT HYPERACTIVITY DISORDER (ADHD), PREDOMINANTLY INATTENTIVE TYPE: ICD-10-CM

## 2023-06-13 RX ORDER — DEXTROAMPHETAMINE SACCHARATE, AMPHETAMINE ASPARTATE, DEXTROAMPHETAMINE SULFATE AND AMPHETAMINE SULFATE 2.5; 2.5; 2.5; 2.5 MG/1; MG/1; MG/1; MG/1
10 TABLET ORAL DAILY
Qty: 30 TABLET | Refills: 0 | Status: SHIPPED | OUTPATIENT
Start: 2023-06-13

## 2024-04-14 ENCOUNTER — HOSPITAL ENCOUNTER (EMERGENCY)
Facility: HOSPITAL | Age: 54
Discharge: HOME | End: 2024-04-14
Attending: EMERGENCY MEDICINE
Payer: COMMERCIAL

## 2024-04-14 VITALS
DIASTOLIC BLOOD PRESSURE: 97 MMHG | HEART RATE: 82 BPM | SYSTOLIC BLOOD PRESSURE: 163 MMHG | TEMPERATURE: 98 F | OXYGEN SATURATION: 99 % | RESPIRATION RATE: 20 BRPM

## 2024-04-14 DIAGNOSIS — R74.8 ELEVATED LIVER ENZYMES: ICD-10-CM

## 2024-04-14 DIAGNOSIS — F41.9 ANXIETY: Primary | ICD-10-CM

## 2024-04-14 DIAGNOSIS — F10.10 ALCOHOL ABUSE: ICD-10-CM

## 2024-04-14 DIAGNOSIS — R07.89 OTHER CHEST PAIN: ICD-10-CM

## 2024-04-14 LAB
2HR DELTA HS TROPONIN: 0 NG/L
ALBUMIN SERPL BCP-MCNC: 4.3 G/DL (ref 3.5–5)
ALP SERPL-CCNC: 83 U/L (ref 34–104)
ALT SERPL W P-5'-P-CCNC: 54 U/L (ref 7–52)
ANION GAP SERPL CALCULATED.3IONS-SCNC: 14 MMOL/L (ref 4–13)
AST SERPL W P-5'-P-CCNC: 197 U/L (ref 13–39)
ATRIAL RATE: 106 BPM
BASOPHILS # BLD AUTO: 0.03 THOUSANDS/ÂΜL (ref 0–0.1)
BASOPHILS NFR BLD AUTO: 1 % (ref 0–1)
BILIRUB SERPL-MCNC: 1.01 MG/DL (ref 0.2–1)
BUN SERPL-MCNC: 21 MG/DL (ref 5–25)
CALCIUM SERPL-MCNC: 9 MG/DL (ref 8.4–10.2)
CARDIAC TROPONIN I PNL SERPL HS: 6 NG/L
CARDIAC TROPONIN I PNL SERPL HS: 6 NG/L
CHLORIDE SERPL-SCNC: 99 MMOL/L (ref 96–108)
CO2 SERPL-SCNC: 25 MMOL/L (ref 21–32)
CREAT SERPL-MCNC: 0.97 MG/DL (ref 0.6–1.3)
EOSINOPHIL # BLD AUTO: 0.02 THOUSAND/ÂΜL (ref 0–0.61)
EOSINOPHIL NFR BLD AUTO: 1 % (ref 0–6)
ERYTHROCYTE [DISTWIDTH] IN BLOOD BY AUTOMATED COUNT: 13.1 % (ref 11.6–15.1)
GFR SERPL CREATININE-BSD FRML MDRD: 88 ML/MIN/1.73SQ M
GLUCOSE SERPL-MCNC: 120 MG/DL (ref 65–140)
HCT VFR BLD AUTO: 42.2 % (ref 36.5–49.3)
HGB BLD-MCNC: 14.6 G/DL (ref 12–17)
IMM GRANULOCYTES # BLD AUTO: 0.01 THOUSAND/UL (ref 0–0.2)
IMM GRANULOCYTES NFR BLD AUTO: 0 % (ref 0–2)
LYMPHOCYTES # BLD AUTO: 1.07 THOUSANDS/ÂΜL (ref 0.6–4.47)
LYMPHOCYTES NFR BLD AUTO: 26 % (ref 14–44)
MCH RBC QN AUTO: 33.3 PG (ref 26.8–34.3)
MCHC RBC AUTO-ENTMCNC: 34.6 G/DL (ref 31.4–37.4)
MCV RBC AUTO: 96 FL (ref 82–98)
MONOCYTES # BLD AUTO: 0.33 THOUSAND/ÂΜL (ref 0.17–1.22)
MONOCYTES NFR BLD AUTO: 8 % (ref 4–12)
NEUTROPHILS # BLD AUTO: 2.61 THOUSANDS/ÂΜL (ref 1.85–7.62)
NEUTS SEG NFR BLD AUTO: 64 % (ref 43–75)
NRBC BLD AUTO-RTO: 0 /100 WBCS
P AXIS: 48 DEGREES
PLATELET # BLD AUTO: 80 THOUSANDS/UL (ref 149–390)
PMV BLD AUTO: 9.8 FL (ref 8.9–12.7)
POTASSIUM SERPL-SCNC: 3.9 MMOL/L (ref 3.5–5.3)
PR INTERVAL: 132 MS
PROT SERPL-MCNC: 7.4 G/DL (ref 6.4–8.4)
QRS AXIS: 38 DEGREES
QRSD INTERVAL: 88 MS
QT INTERVAL: 338 MS
QTC INTERVAL: 448 MS
RBC # BLD AUTO: 4.38 MILLION/UL (ref 3.88–5.62)
SODIUM SERPL-SCNC: 138 MMOL/L (ref 135–147)
T WAVE AXIS: 52 DEGREES
VENTRICULAR RATE: 106 BPM
WBC # BLD AUTO: 4.07 THOUSAND/UL (ref 4.31–10.16)

## 2024-04-14 PROCEDURE — 85025 COMPLETE CBC W/AUTO DIFF WBC: CPT

## 2024-04-14 PROCEDURE — 36415 COLL VENOUS BLD VENIPUNCTURE: CPT

## 2024-04-14 PROCEDURE — 80053 COMPREHEN METABOLIC PANEL: CPT

## 2024-04-14 PROCEDURE — 99285 EMERGENCY DEPT VISIT HI MDM: CPT | Performed by: EMERGENCY MEDICINE

## 2024-04-14 PROCEDURE — 93005 ELECTROCARDIOGRAM TRACING: CPT

## 2024-04-14 PROCEDURE — 99284 EMERGENCY DEPT VISIT MOD MDM: CPT

## 2024-04-14 PROCEDURE — 84484 ASSAY OF TROPONIN QUANT: CPT

## 2024-04-14 PROCEDURE — 93010 ELECTROCARDIOGRAM REPORT: CPT | Performed by: INTERNAL MEDICINE

## 2024-04-14 NOTE — ED PROVIDER NOTES
History  Chief Complaint   Patient presents with    Chest Pain     Patient started having chest pain 2 hours ago and is sob. Patient Diaphoretic     Patient is a 53-year-old male with past medical history of alcohol abuse, nicotine dependence, MDD, anxiety, and ADHD who presents for evaluation of chest pain.  Patient states that he was watching TV approximately 2 hours prior to arrival when he began to experience substernal chest pain. Describes pain as sharp, waxing and waning, non-radiating.  Patient states that he began experiencing left arm tingling and shortness of breath at the same time.  Denying diaphoresis, nausea, jaw pain.  Reports feeling anxious due to symptoms so he called his cousins who advised to come to the emergency room for evaluation.  He denies ever having similar symptoms.  He states that chest pain has improved since coming to the ED, and now equates symptoms felt similar to prior panic attacks. States he has been under a significant amount of stress lately.  Currently not taking any medications for management of anxiety as he has had poor PCP follow-up, but will see PCP on 23 April to resume medications.  He states he has had poor sleep and decreased appetite with approx 20 lb weight loss as a result of his anxiety. Denies SI/HI. Also endorses smoking 1/2 ppd and drinking approx. 1 liter of vodka daily. Has a history tremors from alcohol withdrawal without seizures. Denying any cough, congestion, abdominal pain, nausea, vomiting, fever, chills, dysuria, diarrhea, or constipation.        Prior to Admission Medications   Prescriptions Last Dose Informant Patient Reported? Taking?   albuterol (PROVENTIL HFA,VENTOLIN HFA) 90 mcg/act inhaler   No No   Sig: Inhale 2 puffs every 4 (four) hours as needed for wheezing or shortness of breath   amphetamine-dextroamphetamine (ADDERALL, 10MG,) 10 mg tablet   No No   Sig: Take 1 tablet (10 mg total) by mouth daily Max Daily Amount: 10 mg   busPIRone  (BUSPAR) 30 MG tablet   No No   Sig: Take 1 tablet (30 mg total) by mouth 2 (two) times a day   escitalopram (LEXAPRO) 10 mg tablet   No No   Sig: Take 1 tablet (10 mg total) by mouth daily   nicotine polacrilex (NICORETTE) 4 mg gum   No No   Sig: Chew 1 each (4 mg total) as needed for smoking cessation   traZODone (DESYREL) 50 mg tablet   No No   Sig: One-2 tablets at bedtime as needed for sleep      Facility-Administered Medications: None       Past Medical History:   Diagnosis Date    Alcohol addiction (HCC) 12/14/2015    Anxiety 12/14/2022    Nicotine use disorder 12/14/2022    Recurrent major depressive disorder, in partial remission ([facility]) 12/14/2022       No past surgical history on file.    No family history on file.  I have reviewed and agree with the history as documented.    E-Cigarette/Vaping    E-Cigarette Use Never User      E-Cigarette/Vaping Substances    Nicotine No     THC No     CBD No     Flavoring No     Other No     Unknown No      Social History     Tobacco Use    Smoking status: Every Day     Types: Cigarettes    Smokeless tobacco: Never   Vaping Use    Vaping status: Never Used   Substance Use Topics    Alcohol use: Yes     Comment: 1 bottle of vodka/ day    Drug use: Not Currently        Review of Systems   Constitutional:  Positive for appetite change.   Respiratory:  Positive for shortness of breath.    Cardiovascular:  Positive for chest pain.   Psychiatric/Behavioral:  Positive for sleep disturbance. The patient is nervous/anxious.    All other systems reviewed and are negative.      Physical Exam  ED Triage Vitals [04/14/24 1749]   Temperature Pulse Respirations Blood Pressure SpO2   98 °F (36.7 °C) (!) 107 (!) 26 (!) 151/101 98 %      Temp Source Heart Rate Source Patient Position - Orthostatic VS BP Location FiO2 (%)   Oral Monitor Lying Left arm --      Pain Score       10 - Worst Possible Pain             Orthostatic Vital Signs  Vitals:    04/14/24 1749 04/14/24 1929 04/14/24 2057    BP: (!) 151/101 157/94 163/97   Pulse: (!) 107 94 82   Patient Position - Orthostatic VS: Lying Lying Sitting       Physical Exam  Constitutional:       Appearance: He is well-developed. He is not ill-appearing, toxic-appearing or diaphoretic.      Comments: Anxious appearing      HENT:      Head: Normocephalic and atraumatic.   Cardiovascular:      Rate and Rhythm: Normal rate and regular rhythm.      Heart sounds: Normal heart sounds.   Pulmonary:      Effort: Pulmonary effort is normal. No tachypnea.      Breath sounds: Normal breath sounds.   Chest:      Chest wall: No tenderness.   Abdominal:      Palpations: Abdomen is soft.      Tenderness: There is no abdominal tenderness.   Musculoskeletal:      Cervical back: Normal range of motion and neck supple.      Right lower leg: No edema.      Left lower leg: No edema.   Skin:     General: Skin is warm and dry.      Capillary Refill: Capillary refill takes less than 2 seconds.   Neurological:      General: No focal deficit present.      Mental Status: He is alert and oriented to person, place, and time.      Cranial Nerves: No cranial nerve deficit.      Motor: No weakness.         ED Medications  Medications - No data to display    Diagnostic Studies  Results Reviewed       Procedure Component Value Units Date/Time    HS Troponin I 2hr [[id number]]  (Normal) Collected: 04/14/24 2045    Lab Status: Final result Specimen: Blood from Arm, Left Updated: 04/14/24 2240     hs TnI 2hr 6 ng/L      Delta 2hr hsTnI 0 ng/L     CBC and differential [[id number]]  (Abnormal) Collected: 04/14/24 1837    Lab Status: Final result Specimen: Blood from Arm, Left Updated: 04/14/24 1938     WBC 4.07 Thousand/uL      RBC 4.38 Million/uL      Hemoglobin 14.6 g/dL      Hematocrit 42.2 %      MCV 96 fL      MCH 33.3 pg      MCHC 34.6 g/dL      RDW 13.1 %      MPV 9.8 fL      Platelets 80 Thousands/uL      nRBC 0 /100 WBCs      Segmented % 64 %      Immature Grans % 0 %      Lymphocytes %  26 %      Monocytes % 8 %      Eosinophils Relative 1 %      Basophils Relative 1 %      Absolute Neutrophils 2.61 Thousands/µL      Absolute Immature Grans 0.01 Thousand/uL      Absolute Lymphocytes 1.07 Thousands/µL      Absolute Monocytes 0.33 Thousand/µL      Eosinophils Absolute 0.02 Thousand/µL      Basophils Absolute 0.03 Thousands/µL     HS Troponin 0hr (reflex protocol) [[id number]]  (Normal) Collected: 04/14/24 1837    Lab Status: Final result Specimen: Blood from Arm, Left Updated: 04/14/24 1907     hs TnI 0hr 6 ng/L     Comprehensive metabolic panel [[id number]]  (Abnormal) Collected: 04/14/24 1837    Lab Status: Final result Specimen: Blood from Arm, Left Updated: 04/14/24 1905     Sodium 138 mmol/L      Potassium 3.9 mmol/L      Chloride 99 mmol/L      CO2 25 mmol/L      ANION GAP 14 mmol/L      BUN 21 mg/dL      Creatinine 0.97 mg/dL      Glucose 120 mg/dL      Calcium 9.0 mg/dL       U/L      ALT 54 U/L      Alkaline Phosphatase 83 U/L      Total Protein 7.4 g/dL      Albumin 4.3 g/dL      Total Bilirubin 1.01 mg/dL      eGFR 88 ml/min/1.73sq m     Narrative:      National Kidney Disease Foundation guidelines for Chronic Kidney Disease (CKD):     Stage 1 with normal or high GFR (GFR > 90 mL/min/1.73 square meters)    Stage 2 Mild CKD (GFR = 60-89 mL/min/1.73 square meters)    Stage 3A Moderate CKD (GFR = 45-59 mL/min/1.73 square meters)    Stage 3B Moderate CKD (GFR = 30-44 mL/min/1.73 square meters)    Stage 4 Severe CKD (GFR = 15-29 mL/min/1.73 square meters)    Stage 5 End Stage CKD (GFR <15 mL/min/1.73 square meters)  Note: GFR calculation is accurate only with a steady state creatinine                   No orders to display         Procedures  ECG 12 Lead Documentation Only    Date/Time: 4/15/2024 12:10 AM    Performed by: Sheila Argueta MD  Authorized by: Sheila Argueta MD    Indications / Diagnosis:  Chest pain, SOB  ECG reviewed by me, the ED Provider: yes    Patient location:   ED  Previous ECG:     Previous ECG:  Compared to current    Similarity:  No change  Interpretation:     Interpretation: abnormal    Rate:     ECG rate:  106    ECG rate assessment: tachycardic    Rhythm:     Rhythm: sinus rhythm    Ectopy:     Ectopy: none    QRS:     QRS axis:  Normal    QRS intervals:  Normal  Conduction:     Conduction: normal    ST segments:     ST segments:  Normal  T waves:     T waves: normal          ED Course                             SBIRT 22yo+      Flowsheet Row Most Recent Value   Initial Alcohol Screen: US AUDIT-C     1. How often do you have a drink containing alcohol? 0 Filed at: 04/14/2024 1824   2. How many drinks containing alcohol do you have on a typical day you are drinking?  0 Filed at: 04/14/2024 1824   3a. Male UNDER 65: How often do you have five or more drinks on one occasion? 0 Filed at: 04/14/2024 1824   3b. FEMALE Any Age, or MALE 65+: How often do you have 4 or more drinks on one occassion? 0 Filed at: 04/14/2024 1824   Audit-C Score 0 Filed at: 04/14/2024 1824   TONIE: How many times in the past year have you...    Used an illegal drug or used a prescription medication for non-medical reasons? Never Filed at: 04/14/2024 1824                  Medical Decision Making  Orion Zuniga is a 53 y.o. who presents with complaints of chest pain, SOB, significant anxiety.     Vital signs are tachycardic initially, resolved. Otherwise stable, afebrile  PE: patient anxious, otherwise exam WNL    Ddx: will evaluate for acute cardiopulmonary processes     Plan: EKG WNL  Delta trop 0   CBC WNL  CMP showing elevated liver enzymes  Recommend follow up with PCP for management of anxiety and discussion of elevated LFTs  Counseled patient on smoking cessation  Patient declined HOST referral or detox admission for alcohol abuse    Disposition: Patient stable for discharge. Return precautions provided. Patient understands and is agreeable to plan.         Amount and/or Complexity of  Data Reviewed  Labs: ordered.          Disposition  Final diagnoses:   Anxiety   Other chest pain   Elevated liver enzymes   Alcohol abuse     Time reflects when diagnosis was documented in both MDM as applicable and the Disposition within this note       Time User Action Codes Description Comment    4/14/2024 10:44 PM Sheila Argueta Add [F41.9] Anxiety     4/14/2024 10:44 PM Sheila Argueta Add [R07.89] Other chest pain     4/14/2024 10:45 PM FacSheila vicente Add [R74.8] Elevated liver enzymes     4/14/2024 10:45 PM FacSheila vicente Add [F10.10] Alcohol abuse           ED Disposition       ED Disposition   Discharge    Condition   Stable    Date/Time   Sun Apr 14, 2024 1538    Comment   Orion Zuniga discharge to home/self care.                   Follow-up Information       Follow up With Specialties Details Why Contact Info Additional Information    Velasquez Molina MD Family Medicine Go on 4/23/2024 as scheduled for evaluation of elevated liver enzymes, anxiety, and alcohol abuse [address] [id number] [phone]       [facility] Emergency Department Emergency Medicine Go to  for chest pain or shortness of breath [address] [address] [phone] [facility] Emergency Department, [address], [place], [address]   [phone]            Discharge Medication List as of 4/14/2024 10:47 PM        CONTINUE these medications which have NOT CHANGED    Details   albuterol (PROVENTIL HFA,VENTOLIN HFA) 90 mcg/act inhaler Inhale 2 puffs every 4 (four) hours as needed for wheezing or shortness of breath, Starting Wed 5/17/2023, Normal      amphetamine-dextroamphetamine (ADDERALL, 10MG,) 10 mg tablet Take 1 tablet (10 mg total) by mouth daily Max Daily Amount: 10 mg, Starting Tue 6/13/2023, Normal      busPIRone (BUSPAR) 30 MG tablet Take 1 tablet (30 mg total) by mouth 2 (two) times a day, Starting Tue  5/16/2023, Normal      escitalopram (LEXAPRO) 10 mg tablet Take 1 tablet (10 mg total) by mouth daily, Starting Tue 5/16/2023, Until Thu 6/15/2023, Normal      nicotine polacrilex (NICORETTE) 4 mg gum Chew 1 each (4 mg total) as needed for smoking cessation, Starting Tue 5/23/2023, Normal      traZODone (DESYREL) 50 mg tablet One-2 tablets at bedtime as needed for sleep, Normal           No discharge procedures on file.    PDMP Review         Value Time User    PDMP Reviewed  Yes 6/13/2023  8:34 AM Velasquez Molina MD             ED Provider  Attending physically available and evaluated Orion Zuniga. I managed the patient along with the ED Attending.    Electronically Signed by           Sheila Argueta MD  04/15/24 0014

## 2024-04-14 NOTE — ED ATTENDING ATTESTATION
4/14/2024  I, Pete Nicholas DO, saw and evaluated the patient. I have discussed the patient with the resident/non-physician practitioner and agree with the resident's/non-physician practitioner's findings, Plan of Care, and MDM as documented in the resident's/non-physician practitioner's note, except where noted. All available labs and Radiology studies were reviewed.  I was present for key portions of any procedure(s) performed by the resident/non-physician practitioner and I was immediately available to provide assistance.       At this point I agree with the current assessment done in the Emergency Department.  I have conducted an independent evaluation of this patient a history and physical is as follows:    ED Course  ED Course as of 04/14/24 1829   Sun Apr 14, 2024 1829 Background: 53 y.o. male with chest pain    Differential DX includes but is not limited to: acs/mi, pe, pleurisy, dissection, pneumonia, musculoskeletal chest pain    Plan: cardiac workup             Critical Care Time  Procedures

## 2024-04-23 ENCOUNTER — OFFICE VISIT (OUTPATIENT)
Dept: FAMILY MEDICINE CLINIC | Facility: CLINIC | Age: 54
End: 2024-04-23

## 2024-04-23 VITALS
RESPIRATION RATE: 18 BRPM | TEMPERATURE: 97.8 F | WEIGHT: 191 LBS | HEART RATE: 108 BPM | BODY MASS INDEX: 28.95 KG/M2 | SYSTOLIC BLOOD PRESSURE: 130 MMHG | OXYGEN SATURATION: 97 % | DIASTOLIC BLOOD PRESSURE: 90 MMHG

## 2024-04-23 DIAGNOSIS — F10.20 UNCOMPLICATED ALCOHOL DEPENDENCE (HCC): Primary | ICD-10-CM

## 2024-04-23 DIAGNOSIS — F41.9 ANXIETY: ICD-10-CM

## 2024-04-23 DIAGNOSIS — R79.89 ELEVATED LFTS: ICD-10-CM

## 2024-04-23 DIAGNOSIS — F33.41 RECURRENT MAJOR DEPRESSIVE DISORDER, IN PARTIAL REMISSION (HCC): ICD-10-CM

## 2024-04-23 DIAGNOSIS — F17.200 NICOTINE USE DISORDER: ICD-10-CM

## 2024-04-23 DIAGNOSIS — F10.20 ALCOHOL USE DISORDER, SEVERE, DEPENDENCE (HCC): ICD-10-CM

## 2024-04-23 PROCEDURE — 99214 OFFICE O/P EST MOD 30 MIN: CPT | Performed by: FAMILY MEDICINE

## 2024-04-23 RX ORDER — TRAZODONE HYDROCHLORIDE 50 MG/1
TABLET ORAL
Qty: 180 TABLET | Refills: 3 | Status: SHIPPED | OUTPATIENT
Start: 2024-04-23

## 2024-04-23 RX ORDER — BUSPIRONE HYDROCHLORIDE 30 MG/1
30 TABLET ORAL 2 TIMES DAILY
Qty: 180 TABLET | Refills: 5 | Status: SHIPPED | OUTPATIENT
Start: 2024-04-23

## 2024-04-23 RX ORDER — NALTREXONE HYDROCHLORIDE 50 MG/1
50 TABLET, FILM COATED ORAL DAILY
Qty: 90 TABLET | Refills: 3 | Status: SHIPPED | OUTPATIENT
Start: 2024-04-23

## 2024-04-23 RX ORDER — ESCITALOPRAM OXALATE 10 MG/1
10 TABLET ORAL DAILY
Qty: 90 TABLET | Refills: 3 | Status: SHIPPED | OUTPATIENT
Start: 2024-04-23 | End: 2025-04-18

## 2024-04-23 NOTE — PATIENT INSTRUCTIONS
Presently, does not feel that he needs to go to detox.  He is aware of possible withdrawal symptoms as he goes off alcohol.  Begin Vivitrol 50 mg daily to block alcohol cravings.  Begin Lexapro 10 mg daily for anxiety and depression.  Begin buspirone 30 mg, 1/2 tablet daily for 3 days, then 1/2 tablet twice daily for 3 days and then titrate further until taking 1 whole tablet twice daily.  Trazodone 50 mg, 2 at bedtime for sleep.  Consider adding amphetamine depending on how he does.  Smoking can be addressed some other time.  Discussion of his abnormal liver function tests and the fact that inflammation in the liver can lead to cirrhosis.  He is aware that if he is struggling with alcohol cessation, he should go to [facility].  Recheck in 3 weeks.

## 2024-04-23 NOTE — PROGRESS NOTES
Chief Complaint   Patient presents with    Medication Refill        HPI   Here because he would like to go back on his medications.  Last seen by me on 5/16/2023 after he got out of [place].  Then went to [facility].  He was on Lexapro and BuSpar.  Also Vivitrol.  He was on trazodone to help with sleep.  He was given a trial of 10 mg amphetamine.  Seen in the ER on 4/14.  Presented with chest pain radiating to his left arm.  Continues on a bottle of vodka a day and 1/2 pack of cigarettes.  Anxiety poorly controlled.  Feels that he is lost weight.  Liver function tests were abnormal.        Past Medical History:   Diagnosis Date    Alcohol addiction (HCC) 12/14/2015    Anxiety 12/14/2022    Nicotine use disorder 12/14/2022    Recurrent major depressive disorder, in partial remission (HCC) 12/14/2022        History reviewed. No pertinent surgical history.    Social History     Tobacco Use    Smoking status: Every Day     Types: Cigarettes    Smokeless tobacco: Never   Substance Use Topics    Alcohol use: Yes     Comment: 1 bottle of vodka/ day       Social History     Social History Narrative    Lives with a lady friend in her place. Splits the bills.     Spends  time with Larisa Dillon who is a cousin. Goes there every day.     Was with Patti since 2003.  Ended Feb.2023 because of his drinking. 5-year marriage previously.    2 children from first marriage.    Is a .4/24- has a job [employer].     Enjoys boating.        The following portions of the patient's history were reviewed and updated as appropriate: allergies, current medications, past family history, past medical history, past social history, past surgical history, and problem list.      Review of Systems       /90 (BP Location: Left arm, Patient Position: Sitting, Cuff Size: Standard)   Pulse (!) 108   Temp 97.8 °F (36.6 °C) (Temporal)   Resp 18   Wt 86.6 kg (191 lb)   SpO2 97%   BMI 28.95 kg/m²      Physical Exam                  Current Outpatient Medications:     albuterol (PROVENTIL HFA,VENTOLIN HFA) 90 mcg/act inhaler, Inhale 2 puffs every 4 (four) hours as needed for wheezing or shortness of breath, Disp: 8 g, Rfl: 5    amphetamine-dextroamphetamine (ADDERALL, 10MG,) 10 mg tablet, Take 1 tablet (10 mg total) by mouth daily Max Daily Amount: 10 mg, Disp: 30 tablet, Rfl: 0    busPIRone (BUSPAR) 30 MG tablet, Take 1 tablet (30 mg total) by mouth 2 (two) times a day, Disp: 180 tablet, Rfl: 5    escitalopram (LEXAPRO) 10 mg tablet, Take 1 tablet (10 mg total) by mouth daily, Disp: 90 tablet, Rfl: 3    naltrexone (REVIA) 50 mg tablet, Take 1 tablet (50 mg total) by mouth daily, Disp: 90 tablet, Rfl: 3    nicotine polacrilex (NICORETTE) 4 mg gum, Chew 1 each (4 mg total) as needed for smoking cessation, Disp: 220 each, Rfl: 5    traZODone (DESYREL) 50 mg tablet, One-2 tablets at bedtime as needed for sleep, Disp: 180 tablet, Rfl: 3     No problem-specific Assessment & Plan notes found for this encounter.       Diagnoses and all orders for this visit:    Uncomplicated alcohol dependence (HCC)    Alcohol use disorder, severe, dependence (HCC)  -     naltrexone (REVIA) 50 mg tablet; Take 1 tablet (50 mg total) by mouth daily    Recurrent major depressive disorder, in partial remission (HCC)  -     escitalopram (LEXAPRO) 10 mg tablet; Take 1 tablet (10 mg total) by mouth daily  -     traZODone (DESYREL) 50 mg tablet; One-2 tablets at bedtime as needed for sleep    Anxiety  -     busPIRone (BUSPAR) 30 MG tablet; Take 1 tablet (30 mg total) by mouth 2 (two) times a day    Nicotine use disorder    Elevated LFTs        Patient Instructions   Presently, does not feel that he needs to go to detox.  He is aware of possible withdrawal symptoms as he goes off alcohol.  Begin Vivitrol 50 mg daily to block alcohol cravings.  Begin Lexapro 10 mg daily for anxiety and depression.  Begin buspirone 30 mg, 1/2 tablet daily for 3 days, then 1/2  tablet twice daily for 3 days and then titrate further until taking 1 whole tablet twice daily.  Trazodone 50 mg, 2 at bedtime for sleep.  Consider adding amphetamine depending on how he does.  Smoking can be addressed some other time.  Discussion of his abnormal liver function tests and the fact that inflammation in the liver can lead to cirrhosis.  He is aware that if he is struggling with alcohol cessation, he should go to [facility] ER.  Recheck in 3 weeks.

## 2024-05-14 ENCOUNTER — OFFICE VISIT (OUTPATIENT)
Dept: FAMILY MEDICINE CLINIC | Facility: CLINIC | Age: 54
End: 2024-05-14

## 2024-05-14 VITALS
DIASTOLIC BLOOD PRESSURE: 90 MMHG | TEMPERATURE: 97.6 F | OXYGEN SATURATION: 98 % | HEART RATE: 96 BPM | BODY MASS INDEX: 28.76 KG/M2 | RESPIRATION RATE: 16 BRPM | SYSTOLIC BLOOD PRESSURE: 126 MMHG | WEIGHT: 189.8 LBS | HEIGHT: 68 IN

## 2024-05-14 DIAGNOSIS — F10.20 ALCOHOL USE DISORDER, SEVERE, DEPENDENCE (HCC): Primary | ICD-10-CM

## 2024-05-14 DIAGNOSIS — F41.9 ANXIETY: ICD-10-CM

## 2024-05-14 DIAGNOSIS — R79.89 ELEVATED LFTS: ICD-10-CM

## 2024-05-14 DIAGNOSIS — F33.41 RECURRENT MAJOR DEPRESSIVE DISORDER, IN PARTIAL REMISSION (HCC): ICD-10-CM

## 2024-05-14 PROCEDURE — 99214 OFFICE O/P EST MOD 30 MIN: CPT | Performed by: FAMILY MEDICINE

## 2024-05-14 RX ORDER — NALTREXONE HYDROCHLORIDE 50 MG/1
50 TABLET, FILM COATED ORAL 2 TIMES DAILY
Qty: 60 TABLET | Refills: 5 | Status: SHIPPED | OUTPATIENT
Start: 2024-06-04

## 2024-05-14 NOTE — PATIENT INSTRUCTIONS
Making progress.  Continue naltrexone twice daily.  Other medications stay the same.  Recheck liver function tests in about 4 weeks.  Recheck in 1 month.

## 2024-05-14 NOTE — PROGRESS NOTES
"Chief Complaint   Patient presents with    Follow-up     3w f/u         HPI   Here for follow-up of alcohol use disorder, anxiety, depression, and abnormal liver function tests.  At last visit, given naltrexone to use and restarted on Lexapro and trazodone.  Also given buspirone to use for anxiety.  Has been using naltrexone twice daily and was able to cut back on his use of alcohol.  Still has bad anxiety but notes that he is in between job sites.  Drinking less than half a bottle of vodka a day which is an improvement.  Morning is the hardest time.  Gets withdrawal symptoms in the morning.    Past Medical History:   Diagnosis Date    Alcohol addiction (HCC) 12/14/2015    Anxiety 12/14/2022    Nicotine use disorder 12/14/2022    Recurrent major depressive disorder, in partial remission (HCC) 12/14/2022        History reviewed. No pertinent surgical history.    Social History     Tobacco Use    Smoking status: Every Day     Types: Cigarettes    Smokeless tobacco: Never   Substance Use Topics    Alcohol use: Yes     Comment: 1 bottle of vodka/ day       Social History     Social History Narrative    Lives with a lady friend in her place. Splits the bills.     Spends  time with Larisa Dillon who is a cousin. Goes there every day.     Was with Patti since 2003.  Ended Feb.2023 because of his drinking. 5-year marriage previously.    2 children from first marriage.    Is a .4/24- has a job [employer].     Enjoys boating.        The following portions of the patient's history were reviewed and updated as appropriate: allergies, current medications, past family history, past medical history, past social history, past surgical history, and problem list.      Review of Systems       /90 (BP Location: Left arm, Patient Position: Sitting, Cuff Size: Standard)   Pulse 96   Temp 97.6 °F (36.4 °C) (Temporal)   Resp 16   Ht 5' 8.11\" (1.73 m)   Wt 86.1 kg (189 lb 12.8 oz)   SpO2 98%   BMI 28.77 kg/m²  "     Physical Exam   Appears relaxed.  Interacts appropriately.  Good judgment.                Current Outpatient Medications:     albuterol (PROVENTIL HFA,VENTOLIN HFA) 90 mcg/act inhaler, Inhale 2 puffs every 4 (four) hours as needed for wheezing or shortness of breath, Disp: 8 g, Rfl: 5    busPIRone (BUSPAR) 30 MG tablet, Take 1 tablet (30 mg total) by mouth 2 (two) times a day, Disp: 180 tablet, Rfl: 5    escitalopram (LEXAPRO) 10 mg tablet, Take 1 tablet (10 mg total) by mouth daily, Disp: 90 tablet, Rfl: 3    [START ON 6/4/2024] naltrexone (REVIA) 50 mg tablet, Take 1 tablet (50 mg total) by mouth 2 (two) times a day Do not start before June 4, 2024., Disp: 60 tablet, Rfl: 5    nicotine polacrilex (NICORETTE) 4 mg gum, Chew 1 each (4 mg total) as needed for smoking cessation, Disp: 220 each, Rfl: 5    traZODone (DESYREL) 50 mg tablet, One-2 tablets at bedtime as needed for sleep, Disp: 180 tablet, Rfl: 3    amphetamine-dextroamphetamine (ADDERALL, 10MG,) 10 mg tablet, Take 1 tablet (10 mg total) by mouth daily Max Daily Amount: 10 mg (Patient not taking: Reported on 5/14/2024), Disp: 30 tablet, Rfl: 0     No problem-specific Assessment & Plan notes found for this encounter.       Diagnoses and all orders for this visit:    Alcohol use disorder, severe, dependence (HCC)  -     naltrexone (REVIA) 50 mg tablet; Take 1 tablet (50 mg total) by mouth 2 (two) times a day Do not start before June 4, 2024.    Anxiety    Elevated LFTs  -     Hepatic function panel; Future    Recurrent major depressive disorder, in partial remission (HCC)        Patient Instructions   Making progress.  Continue naltrexone twice daily.  Other medications stay the same.  Recheck liver function tests in about 4 weeks.  Recheck in 1 month.

## 2024-06-28 ENCOUNTER — APPOINTMENT (OUTPATIENT)
Dept: URGENT CARE | Facility: CLINIC | Age: 54
End: 2024-06-28

## 2024-12-03 ENCOUNTER — OFFICE VISIT (OUTPATIENT)
Dept: FAMILY MEDICINE CLINIC | Facility: CLINIC | Age: 54
End: 2024-12-03

## 2024-12-03 VITALS
WEIGHT: 185 LBS | OXYGEN SATURATION: 97 % | SYSTOLIC BLOOD PRESSURE: 120 MMHG | DIASTOLIC BLOOD PRESSURE: 80 MMHG | TEMPERATURE: 97.5 F | HEART RATE: 96 BPM | RESPIRATION RATE: 16 BRPM | BODY MASS INDEX: 28.04 KG/M2 | HEIGHT: 68 IN

## 2024-12-03 DIAGNOSIS — F17.200 NICOTINE USE DISORDER: ICD-10-CM

## 2024-12-03 DIAGNOSIS — Z20.822 CLOSE EXPOSURE TO COVID-19 VIRUS: Primary | ICD-10-CM

## 2024-12-03 DIAGNOSIS — F33.41 RECURRENT MAJOR DEPRESSIVE DISORDER, IN PARTIAL REMISSION (HCC): ICD-10-CM

## 2024-12-03 DIAGNOSIS — Z12.2 SCREENING FOR LUNG CANCER: ICD-10-CM

## 2024-12-03 DIAGNOSIS — F41.9 ANXIETY: ICD-10-CM

## 2024-12-03 DIAGNOSIS — F90.0 ATTENTION DEFICIT HYPERACTIVITY DISORDER (ADHD), PREDOMINANTLY INATTENTIVE TYPE: ICD-10-CM

## 2024-12-03 LAB
SARS-COV-2 AG UPPER RESP QL IA: NEGATIVE
VALID CONTROL: NORMAL

## 2024-12-03 PROCEDURE — 99214 OFFICE O/P EST MOD 30 MIN: CPT | Performed by: FAMILY MEDICINE

## 2024-12-03 PROCEDURE — 87811 SARS-COV-2 COVID19 W/OPTIC: CPT | Performed by: FAMILY MEDICINE

## 2024-12-03 RX ORDER — DEXTROAMPHETAMINE SACCHARATE, AMPHETAMINE ASPARTATE, DEXTROAMPHETAMINE SULFATE AND AMPHETAMINE SULFATE 2.5; 2.5; 2.5; 2.5 MG/1; MG/1; MG/1; MG/1
10 TABLET ORAL
Qty: 60 TABLET | Refills: 0 | Status: SHIPPED | OUTPATIENT
Start: 2024-12-03

## 2024-12-03 NOTE — PATIENT INSTRUCTIONS
COVID test is negative and he is not having symptoms.  Okay to return to work.  Review of medications which is keeping him doing pretty well.  Discussion of his history of smoking.  Low-dose chest CT is ordered and should be obtained after he has his insurance.  Still struggling with focus.  Trial of Adderall starting at 10 mg in the morning for the first 1-2 weeks.  If inadequate response, increase to 20 mg.  Follow-up in January after his insurance kicks in.

## 2024-12-03 NOTE — PROGRESS NOTES
"Chief Complaint   Patient presents with    COVID-19     Patient was exposed to covid. Need a test for work        HPI   Here because he was exposed to COVID.  His boss is concerned about him returning to work until he is evaluated.  Currently, asymptomatic.  New job since last we met which he likes at [employer].  Cutting back on alcohol.  Feels that his breathing is finally.  Okay taking a deep breath but is short softer breath feels uncomfortable.  No actual chest pain.  Was seen in the ER on April 14 with chest pain which was thought to be likely to anxiety or panic attack.  Workup was negative.    Still has difficulty with focus.  Was prescribed Adderall last December but never had it filled.  Past Medical History:   Diagnosis Date    Alcohol addiction ([facility]) 12/14/2015    Anxiety 12/14/2022    Nicotine use disorder 12/14/2022    Recurrent major depressive disorder, in partial remission ([facility]) 12/14/2022        No past surgical history on file.    Social History     Tobacco Use    Smoking status: Every Day     Types: Cigarettes    Smokeless tobacco: Never   Substance Use Topics    Alcohol use: Yes     Comment: 1 bottle of vodka/ day       Social History     Social History Narrative    Lives with a lady friend in her place. Splits the bills.     Spends  time with Larisa Dillon who is a cousin. Goes there every day.     Was with Patti since 2003.  Ended Feb.2023 because of his drinking. 5-year marriage previously.    2 children from first marriage.    Is a .4/24- has a job [employer].     Enjoys boating.        The following portions of the patient's history were reviewed and updated as appropriate: allergies, current medications, past family history, past medical history, past social history, past surgical history, and problem list.      Review of Systems       /80   Pulse 96   Temp 97.5 °F (36.4 °C) (Temporal)   Resp 16   Ht 5' 8.11\" (1.73 m)   Wt 83.9 kg (185 lb)   SpO2 97%   BMI 28.04 " kg/m²      Physical Exam   Appears well.  Eardrums are white.  Throat reveals no erythema.  Lungs are clear.  No wheezing.  Heart regular.  Nasal swab for COVID is negative.            Current Outpatient Medications:     albuterol (PROVENTIL HFA,VENTOLIN HFA) 90 mcg/act inhaler, Inhale 2 puffs every 4 (four) hours as needed for wheezing or shortness of breath, Disp: 8 g, Rfl: 5    busPIRone (BUSPAR) 30 MG tablet, Take 1 tablet (30 mg total) by mouth 2 (two) times a day, Disp: 180 tablet, Rfl: 5    escitalopram (LEXAPRO) 10 mg tablet, Take 1 tablet (10 mg total) by mouth daily, Disp: 90 tablet, Rfl: 3    naltrexone (REVIA) 50 mg tablet, Take 1 tablet (50 mg total) by mouth 2 (two) times a day Do not start before June 4, 2024., Disp: 60 tablet, Rfl: 5    nicotine polacrilex (NICORETTE) 4 mg gum, Chew 1 each (4 mg total) as needed for smoking cessation, Disp: 220 each, Rfl: 5    amphetamine-dextroamphetamine (ADDERALL, 10MG,) 10 mg tablet, Take 1 tablet (10 mg total) by mouth daily Max Daily Amount: 10 mg (Patient not taking: Reported on 5/14/2024), Disp: 30 tablet, Rfl: 0    traZODone (DESYREL) 50 mg tablet, One-2 tablets at bedtime as needed for sleep (Patient not taking: Reported on 12/3/2024), Disp: 180 tablet, Rfl: 3     No problem-specific Assessment & Plan notes found for this encounter.       Diagnoses and all orders for this visit:    Close exposure to COVID-19 virus  -     POCT Rapid Covid Ag    Nicotine use disorder    Anxiety    Recurrent major depressive disorder, in partial remission (HCC)    Screening for lung cancer  -     CT lung screening program; Future    Attention deficit hyperactivity disorder (ADHD), predominantly inattentive type  -     amphetamine-dextroamphetamine (ADDERALL, 10MG,) 10 mg tablet; Take 1 tablet (10 mg total) by mouth 2 (two) times a day Max Daily Amount: 20 mg        There are no Patient Instructions on file for this visit. I discussed with him that he is a candidate for lung  cancer CT screening.     The following Shared Decision-Making points were covered:  Benefits of screening were discussed, including the rates of reduction in death from lung cancer and other causes.  Harms of screening were reviewed, including false positive tests, radiation exposure levels, risks of invasive procedures, risks of complications of screening, and risk of overdiagnosis.  I counseled on the importance of adherence to annual lung cancer LDCT screening, impact of co-morbidities, and ability or willingness to undergo diagnosis and treatment.  I counseled on the importance of maintaining abstinence as a former smoker or was counseled on the importance of smoking cessation if a current smoker    Review of Eligibility Criteria: He meets all of the criteria for Lung Cancer Screening.   He is 54 y.o.   He has 20 pack year tobacco history and is a current smoker or has quit within the past 15 years  He presents no signs or symptoms of lung cancer    After discussion, the patient decided to elect lung cancer screening.

## 2024-12-03 NOTE — LETTER
December 3, 2024     Patient: Orion Zuniga  YOB: 1970  Date of Visit: 12/3/2024      To Whom it May Concern:    Orion Zuniga is under my professional care. Orion was seen in my office on 12/3/2024. Orion has been evaluated after exposure to COVID.  He is asymptomatic and test for COVID is negative.  He is okay to return to work.    If you have any questions or concerns, please don't hesitate to call.         Sincerely,          Velasquez Molina MD        CC: No Recipients

## 2025-01-08 ENCOUNTER — OFFICE VISIT (OUTPATIENT)
Dept: FAMILY MEDICINE CLINIC | Facility: CLINIC | Age: 55
End: 2025-01-08
Payer: COMMERCIAL

## 2025-01-08 VITALS
HEART RATE: 87 BPM | DIASTOLIC BLOOD PRESSURE: 86 MMHG | RESPIRATION RATE: 18 BRPM | SYSTOLIC BLOOD PRESSURE: 124 MMHG | TEMPERATURE: 97.1 F | WEIGHT: 201 LBS | BODY MASS INDEX: 30.46 KG/M2 | OXYGEN SATURATION: 97 % | HEIGHT: 68 IN

## 2025-01-08 DIAGNOSIS — F90.0 ATTENTION DEFICIT HYPERACTIVITY DISORDER (ADHD), PREDOMINANTLY INATTENTIVE TYPE: ICD-10-CM

## 2025-01-08 DIAGNOSIS — F17.200 NICOTINE USE DISORDER: ICD-10-CM

## 2025-01-08 DIAGNOSIS — Z12.12 SCREENING FOR COLORECTAL CANCER: ICD-10-CM

## 2025-01-08 DIAGNOSIS — Z12.2 SCREENING FOR LUNG CANCER: ICD-10-CM

## 2025-01-08 DIAGNOSIS — Z12.11 SCREENING FOR COLORECTAL CANCER: ICD-10-CM

## 2025-01-08 DIAGNOSIS — Z00.00 HEALTH MAINTENANCE EXAMINATION: Primary | ICD-10-CM

## 2025-01-08 DIAGNOSIS — F41.9 ANXIETY: ICD-10-CM

## 2025-01-08 DIAGNOSIS — F10.20 ALCOHOL USE DISORDER, SEVERE, DEPENDENCE (HCC): ICD-10-CM

## 2025-01-08 DIAGNOSIS — F33.41 RECURRENT MAJOR DEPRESSIVE DISORDER, IN PARTIAL REMISSION (HCC): ICD-10-CM

## 2025-01-08 PROCEDURE — 99214 OFFICE O/P EST MOD 30 MIN: CPT | Performed by: FAMILY MEDICINE

## 2025-01-08 PROCEDURE — 99396 PREV VISIT EST AGE 40-64: CPT | Performed by: FAMILY MEDICINE

## 2025-01-08 RX ORDER — DEXTROAMPHETAMINE SACCHARATE, AMPHETAMINE ASPARTATE, DEXTROAMPHETAMINE SULFATE AND AMPHETAMINE SULFATE 2.5; 2.5; 2.5; 2.5 MG/1; MG/1; MG/1; MG/1
10 TABLET ORAL
Qty: 60 TABLET | Refills: 0 | Status: SHIPPED | OUTPATIENT
Start: 2025-01-08

## 2025-01-08 NOTE — PROGRESS NOTES
Name: Orion Zuniga      : 1970      MRN: [id number]  Encounter Provider: Velasquez Molina MD  Encounter Date: 2025   Encounter department: [facility]    Assessment & Plan  Health maintenance examination         Recurrent major depressive disorder, in partial remission (HCC)         Nicotine use disorder         Anxiety         Attention deficit hyperactivity disorder (ADHD), predominantly inattentive type    Orders:  •  amphetamine-dextroamphetamine (ADDERALL, 10MG,) 10 mg tablet; Take 1 tablet (10 mg total) by mouth 2 (two) times a day Max Daily Amount: 20 mg    Alcohol use disorder, severe, dependence (HCC)         Screening for colorectal cancer    Orders:  •  Ambulatory referral to Gastroenterology; Future    Screening for lung cancer  I discussed with him that he is a candidate for lung cancer CT screening.     The following Shared Decision-Making points were covered:  Benefits of screening were discussed, including the rates of reduction in death from lung cancer and other causes.  Harms of screening were reviewed, including false positive tests, radiation exposure levels, risks of invasive procedures, risks of complications of screening, and risk of overdiagnosis.  I counseled on the importance of adherence to annual lung cancer LDCT screening, impact of co-morbidities, and ability or willingness to undergo diagnosis and treatment.  I counseled on the importance of maintaining abstinence as a former smoker or was counseled on the importance of smoking cessation if a current smoker    Review of Eligibility Criteria: He meets all of the criteria for Lung Cancer Screening.   He is 54 y.o.   He has 20 pack year tobacco history and is a current smoker or has quit within the past 15 years  He presents no signs or symptoms of lung cancer    After discussion, the patient decided to elect lung cancer screening.  Orders:  •  CT lung screening program; Future         History of  Present Illness     HPI  Here for follow-up of ADHD, anxiety, nicotine use disorder, and depression.  Was prescribed Adderall last visit which he did not have filled.  Now has insurance and would like to try it.  Drinking and smoking a lot less.  Continues on ReVia.  Continues on Lexapro and BuSpar for anxiety.  Continues with major anxiety.  Not needing trazodone.  Job is going well.  Admits to at least 1/5 of vodka daily.  Says he lacks focus.    Review of Systems  Past Medical History:   Diagnosis Date   • Alcohol addiction (HCC) 12/14/2015   • Anxiety 12/14/2022   • Nicotine use disorder 12/14/2022   • Recurrent major depressive disorder, in partial remission (HCC) 12/14/2022     History reviewed. No pertinent surgical history.  History reviewed. No pertinent family history.  Social History     Tobacco Use   • Smoking status: Every Day     Current packs/day: 1.00     Average packs/day: 1 pack/day for 30.0 years (30.0 ttl pk-yrs)     Types: Cigarettes   • Smokeless tobacco: Never   Vaping Use   • Vaping status: Never Used   Substance and Sexual Activity   • Alcohol use: Yes     Comment: 1 bottle of vodka/ day   • Drug use: Not Currently   • Sexual activity: Yes     Current Outpatient Medications on File Prior to Visit   Medication Sig   • albuterol (PROVENTIL HFA,VENTOLIN HFA) 90 mcg/act inhaler Inhale 2 puffs every 4 (four) hours as needed for wheezing or shortness of breath   • busPIRone (BUSPAR) 30 MG tablet Take 1 tablet (30 mg total) by mouth 2 (two) times a day   • escitalopram (LEXAPRO) 10 mg tablet Take 1 tablet (10 mg total) by mouth daily   • naltrexone (REVIA) 50 mg tablet Take 1 tablet (50 mg total) by mouth 2 (two) times a day Do not start before June 4, 2024.   • nicotine polacrilex (NICORETTE) 4 mg gum Chew 1 each (4 mg total) as needed for smoking cessation   • [DISCONTINUED] amphetamine-dextroamphetamine (ADDERALL, 10MG,) 10 mg tablet Take 1 tablet (10 mg total) by mouth 2 (two) times a day Max  "Daily Amount: 20 mg   • traZODone (DESYREL) 50 mg tablet One-2 tablets at bedtime as needed for sleep (Patient not taking: Reported on 1/8/2025)     No Known Allergies  Immunization History   Administered Date(s) Administered   • H1N1, All Formulations 01/14/2010     Objective   /86 (Patient Position: Sitting, Cuff Size: Large)   Pulse 87   Temp (!) 97.1 °F (36.2 °C) (Tympanic)   Resp 18   Ht 5' 8.11\" (1.73 m)   Wt 91.2 kg (201 lb)   SpO2 97%   BMI 30.46 kg/m²     Physical Exam  Healthy appearing individual in no acute distress.  Extraocular motions are intact.  Both ear drums are white.  Hearing is grossly intact.  Throat reveals no erythema.  Teeth are in good repair.  No neck nodes or thyromegaly.  Lungs are clear.  Heart regular with no murmurs or gallops.  Abdomen is soft and nontender.  No leg edema.  Skin reveals no apparent rash.  Neurologic grossly within normal limits.  Normal mood and affect.  Musculoskeletal exam grossly within normal limits.        "

## 2025-01-08 NOTE — PATIENT INSTRUCTIONS
"Discussion of his continued use of alcohol which makes it hard to treat other things although he probably is treating his anxiety to some degree with alcohol.  Struggles with focus.  Will agree to using Adderall morning and afternoon 10 mg twice daily for a month to see if it makes a difference.  After discussion, lung cancer screen is ordered.  Low-dose chest CT.  Also referral for screening colonoscopy.  Continue Lexapro and buspirone for anxiety.  Using ReVia but not helping much.  My suggestion would be to return [facility] to help getting detoxed off alcohol.  He shakes his head at that suggestion.  Recheck in 1 month.    Patient Education     Routine physical for adults   The Basics   Written by the doctors and editors at [facility]   What is a physical? -- A physical is a routine visit, or \"check-up,\" with your doctor. You might also hear it called a \"wellness visit\" or \"preventive visit.\"  During each visit, the doctor will:   Ask about your physical and mental health   Ask about your habits, behaviors, and lifestyle   Do an exam   Give you vaccines if needed   Talk to you about any medicines you take   Give advice about your health   Answer your questions  Getting regular check-ups is an important part of taking care of your health. It can help your doctor find and treat any problems you have. But it's also important for preventing health problems.  A routine physical is different from a \"sick visit.\" A sick visit is when you see a doctor because of a health concern or problem. Since physicals are scheduled ahead of time, you can think about what you want to ask the doctor.  How often should I get a physical? -- It depends on your age and health. In general, for people age 21 years and older:   If you are younger than 50 years, you might be able to get a physical every 3 years.   If you are 50 years or older, your doctor might recommend a physical every year.  If you have an ongoing health condition, " "like diabetes or high blood pressure, your doctor will probably want to see you more often.  What happens during a physical? -- In general, each visit will include:   Physical exam - The doctor or nurse will check your height, weight, heart rate, and blood pressure. They will also look at your eyes and ears. They will ask about how you are feeling and whether you have any symptoms that bother you.   Medicines - It's a good idea to bring a list of all the medicines you take to each doctor visit. Your doctor will talk to you about your medicines and answer any questions. Tell them if you are having any side effects that bother you. You should also tell them if you are having trouble paying for any of your medicines.   Habits and behaviors - This includes:   Your diet   Your exercise habits   Whether you smoke, drink alcohol, or use drugs   Whether you are sexually active   Whether you feel safe at home  Your doctor will talk to you about things you can do to improve your health and lower your risk of health problems. They will also offer help and support. For example, if you want to quit smoking, they can give you advice and might prescribe medicines. If you want to improve your diet or get more physical activity, they can help you with this, too.   Lab tests, if needed - The tests you get will depend on your age and situation. For example, your doctor might want to check your:   Cholesterol   Blood sugar   Iron level   Vaccines - The recommended vaccines will depend on your age, health, and what vaccines you already had. Vaccines are very important because they can prevent certain serious or deadly infections.   Discussion of screening - \"Screening\" means checking for diseases or other health problems before they cause symptoms. Your doctor can recommend screening based on your age, risk, and preferences. This might include tests to check for:   Cancer, such as breast, prostate, cervical, ovarian, colorectal, " prostate, lung, or skin cancer   Sexually transmitted infections, such as chlamydia and gonorrhea   Mental health conditions like depression and anxiety  Your doctor will talk to you about the different types of screening tests. They can help you decide which screenings to have. They can also explain what the results might mean.   Answering questions - The physical is a good time to ask the doctor or nurse questions about your health. If needed, they can refer you to other doctors or specialists, too.  Adults older than 65 years often need other care, too. As you get older, your doctor will talk to you about:   How to prevent falling at home   Hearing or vision tests   Memory testing   How to take your medicines safely   Making sure that you have the help and support you need at home  All topics are updated as new evidence becomes available and our peer review process is complete.  This topic retrieved from [employer] on: May 02, 2024.  Topic 220543 Version 1.0  Release: 32.4.3 - C32.122  © 2024 UpToDate, Inc. and/or its affiliates. All rights reserved.  Consumer Information Use and Disclaimer   Disclaimer: This generalized information is a limited summary of diagnosis, treatment, and/or medication information. It is not meant to be comprehensive and should be used as a tool to help the user understand and/or assess potential diagnostic and treatment options. It does NOT include all information about conditions, treatments, medications, side effects, or risks that may apply to a specific patient. It is not intended to be medical advice or a substitute for the medical advice, diagnosis, or treatment of a health care provider based on the health care provider's examination and assessment of a patient's specific and unique circumstances. Patients must speak with a health care provider for complete information about their health, medical questions, and treatment options, including any risks or benefits regarding use of  medications. This information does not endorse any treatments or medications as safe, effective, or approved for treating a specific patient. UpToDate, Inc. and its affiliates disclaim any warranty or liability relating to this information or the use thereof.The use of this information is governed by the Terms of Use, available at https://www.[employer].com/en/know/clinical-effectiveness-terms. 2024© UpToDate, Inc. and its affiliates and/or licensors. All rights reserved.  Copyright   © 2024 UpToDate, Inc. and/or its affiliates. All rights reserved.

## 2025-01-08 NOTE — ASSESSMENT & PLAN NOTE
Orders:  •  amphetamine-dextroamphetamine (ADDERALL, 10MG,) 10 mg tablet; Take 1 tablet (10 mg total) by mouth 2 (two) times a day Max Daily Amount: 20 mg

## 2025-07-08 ENCOUNTER — OFFICE VISIT (OUTPATIENT)
Dept: FAMILY MEDICINE CLINIC | Facility: CLINIC | Age: 55
End: 2025-07-08
Payer: COMMERCIAL

## 2025-07-08 VITALS
SYSTOLIC BLOOD PRESSURE: 138 MMHG | RESPIRATION RATE: 18 BRPM | BODY MASS INDEX: 28.61 KG/M2 | WEIGHT: 188.8 LBS | TEMPERATURE: 97.8 F | DIASTOLIC BLOOD PRESSURE: 90 MMHG | HEIGHT: 68 IN

## 2025-07-08 DIAGNOSIS — F41.9 ANXIETY: ICD-10-CM

## 2025-07-08 DIAGNOSIS — Z12.2 SCREENING FOR LUNG CANCER: ICD-10-CM

## 2025-07-08 DIAGNOSIS — L24.1 IRRITANT CONTACT DERMATITIS DUE TO OILS: Primary | ICD-10-CM

## 2025-07-08 DIAGNOSIS — Z12.11 ENCOUNTER FOR SCREENING COLONOSCOPY: ICD-10-CM

## 2025-07-08 DIAGNOSIS — F17.200 NICOTINE USE DISORDER: ICD-10-CM

## 2025-07-08 DIAGNOSIS — F33.41 RECURRENT MAJOR DEPRESSIVE DISORDER, IN PARTIAL REMISSION (HCC): ICD-10-CM

## 2025-07-08 DIAGNOSIS — F17.210 SMOKING GREATER THAN 20 PACK YEARS: ICD-10-CM

## 2025-07-08 PROCEDURE — 99214 OFFICE O/P EST MOD 30 MIN: CPT | Performed by: FAMILY MEDICINE

## 2025-07-08 RX ORDER — ESCITALOPRAM OXALATE 10 MG/1
10 TABLET ORAL DAILY
Start: 2025-07-08 | End: 2026-07-03

## 2025-07-08 RX ORDER — CLOBETASOL PROPIONATE 0.5 MG/G
CREAM TOPICAL 2 TIMES DAILY
Qty: 45 G | Refills: 1 | Status: SHIPPED | OUTPATIENT
Start: 2025-07-08

## 2025-07-08 NOTE — LETTER
July 8, 2025     Patient: Orion Zuniga  YOB: 1970  Date of Visit: 7/8/2025      To Whom it May Concern:    Orion Zuniga is under my professional care. Orion was seen in my office on 7/8/2025. Orion may return to work on 7/9..    If you have any questions or concerns, please don't hesitate to call.         Sincerely,          Velasquez Molina MD        CC: No Recipients

## 2025-07-08 NOTE — PATIENT INSTRUCTIONS
Suspect an irritant contact dermatitis causing the breakout on the hands.  Clobetasol can be applied twice daily to affected area including in between fingers.  Discussion of screening colonoscopy and also low-dose chest CT as a screen for lung cancer.  Congratulated on getting smoking down to 4 cigarettes a day.  It appears that alcohol is under control.    Return as scheduled next January.

## 2025-07-08 NOTE — PROGRESS NOTES
Name: Orion Zuniga      : 1970      MRN: [id number]  Encounter Provider: Velasquez Molina MD  Encounter Date: 2025   Encounter department: [facility]    Assessment & Plan  Irritant contact dermatitis due to oils    Orders:  •  clobetasol (TEMOVATE) 0.05 % cream; Apply topically 2 (two) times a day    Encounter for screening colonoscopy    Orders:  •  Ambulatory Referral to Gastroenterology; Future    Screening for lung cancer  I discussed with him that he is a candidate for lung cancer CT screening.     The following Shared Decision-Making points were covered:  Benefits of screening were discussed, including the rates of reduction in death from lung cancer and other causes.  Harms of screening were reviewed, including false positive tests, radiation exposure levels, risks of invasive procedures, risks of complications of screening, and risk of overdiagnosis.  I counseled on the importance of adherence to annual lung cancer LDCT screening, impact of co-morbidities, and ability or willingness to undergo diagnosis and treatment.  I counseled on the importance of maintaining abstinence as a former smoker or was counseled on the importance of smoking cessation if a current smoker    Review of Eligibility Criteria: He meets all of the criteria for Lung Cancer Screening.   He is 55 y.o.   He has 20 pack year tobacco history and is a current smoker or has quit within the past 15 years  He presents no signs or symptoms of lung cancer    After discussion, the patient decided to elect lung cancer screening.    Orders:  •  CT Lung Screening Program; Future    Smoking greater than 20 pack years    Orders:  •  CT Lung Screening Program; Future    Recurrent major depressive disorder, in partial remission (HCC)      Orders:  •  escitalopram (LEXAPRO) 10 mg tablet; Take 1 tablet (10 mg total) by mouth daily    Nicotine use disorder         Anxiety            Patient Instructions   Suspect an  "irritant contact dermatitis causing the breakout on the hands.  Clobetasol can be applied twice daily to affected area including in between fingers.  Discussion of screening colonoscopy and also low-dose chest CT as a screen for lung cancer.  Congratulated on getting smoking down to 4 cigarettes a day.  It appears that alcohol is under control.    Return as scheduled next January.      History of Present Illness     HPI  Here with a rash on his hands.  Present for about 1 week.  Seems to be spreading from the right hand to the left.  Also involves the fingers.  Hands are exposed to a lot of different things such as race fuel.  Brought some baby chickens.  At work today, they saw his hands and suggested that he go to the ER.  The rash is not painful.  Otherwise, says he is doing well.  Smoking down to 4 cigarettes a day.  Alcohol is much less.  Stopped taking all his medications including ReVia.  If very anxious, he takes one of the pills.  Lexapro.    Review of Systems    Past Medical History[1]  Past Surgical History[2]  Social History     Social History Narrative    Lives with Larisa Dillon who is a cousin. Splits the bills.     Was with Patti since 2003.  Ended Feb.2023 because of his drinking. 5-year marriage previously.    2 children from first marriage.    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    Enjoys boating.     Medications[3]  No Known Allergies  Immunization History   Administered Date(s) Administered   • H1N1, All Formulations 01/14/2010     Objective   /90 (BP Location: Left arm, Patient Position: Sitting, Cuff Size: Standard)   Temp 97.8 °F (36.6 °C) (Temporal)   Resp 18   Ht 5' 8.11\" (1.73 m)   Wt 85.6 kg (188 lb 12.8 oz)   BMI 28.61 kg/m²     Physical Exam             [1]  Past Medical History:  Diagnosis Date   • Alcohol addiction (HCC) 12/14/2015   • Anxiety 12/14/2022   • Nicotine use disorder 12/14/2022   • Recurrent major depressive disorder, in partial remission (HCC) 12/14/2022   [2]  No " past surgical history on file.[3]  Current Outpatient Medications on File Prior to Visit   Medication Sig   • albuterol (PROVENTIL HFA,VENTOLIN HFA) 90 mcg/act inhaler Inhale 2 puffs every 4 (four) hours as needed for wheezing or shortness of breath   • busPIRone (BUSPAR) 30 MG tablet Take 1 tablet (30 mg total) by mouth 2 (two) times a day   • [DISCONTINUED] amphetamine-dextroamphetamine (ADDERALL, 10MG,) 10 mg tablet Take 1 tablet (10 mg total) by mouth 2 (two) times a day Max Daily Amount: 20 mg   • [DISCONTINUED] naltrexone (REVIA) 50 mg tablet Take 1 tablet (50 mg total) by mouth 2 (two) times a day Do not start before June 4, 2024.   • [DISCONTINUED] nicotine polacrilex (NICORETTE) 4 mg gum Chew 1 each (4 mg total) as needed for smoking cessation   • [DISCONTINUED] escitalopram (LEXAPRO) 10 mg tablet Take 1 tablet (10 mg total) by mouth daily   • [DISCONTINUED] traZODone (DESYREL) 50 mg tablet One-2 tablets at bedtime as needed for sleep (Patient not taking: Reported on 12/3/2024)

## 2025-07-23 ENCOUNTER — TELEPHONE (OUTPATIENT)
Age: 55
End: 2025-07-23

## 2025-07-23 ENCOUNTER — OFFICE VISIT (OUTPATIENT)
Dept: FAMILY MEDICINE CLINIC | Facility: CLINIC | Age: 55
End: 2025-07-23
Payer: COMMERCIAL

## 2025-07-23 VITALS
RESPIRATION RATE: 18 BRPM | SYSTOLIC BLOOD PRESSURE: 148 MMHG | DIASTOLIC BLOOD PRESSURE: 94 MMHG | WEIGHT: 181.2 LBS | BODY MASS INDEX: 27.46 KG/M2 | TEMPERATURE: 98.7 F | HEIGHT: 68 IN

## 2025-07-23 DIAGNOSIS — L40.8 PALMOPLANTAR PSORIASIS: Primary | ICD-10-CM

## 2025-07-23 PROCEDURE — 99214 OFFICE O/P EST MOD 30 MIN: CPT | Performed by: FAMILY MEDICINE

## 2025-07-23 RX ORDER — PREDNISONE 10 MG/1
TABLET ORAL
Qty: 30 TABLET | Refills: 0 | Status: SHIPPED | OUTPATIENT
Start: 2025-07-23

## 2025-07-23 RX ORDER — METHOTREXATE 2.5 MG/1
2.5 TABLET ORAL
Qty: 18 TABLET | Refills: 2 | Status: SHIPPED | OUTPATIENT
Start: 2025-07-28

## 2025-07-23 RX ORDER — PREDNISONE 5 MG/1
TABLET ORAL
COMMUNITY
Start: 2025-07-14

## 2025-07-23 NOTE — LETTER
July 23, 2025     Patient: Orion Zuniga  YOB: 1970  Date of Visit: 7/23/2025      To Whom it May Concern:    Orion Zuniga is under my professional.  He has a skin condition similar to psoriasis which is not contagious.  There is no reason that he cannot continue to work.      If you have any questions or concerns, please don't hesitate to call.         Sincerely,          Velasquez Molina MD        CC: No Recipients

## 2025-07-23 NOTE — PROGRESS NOTES
"Name: Orion Zuniga      : 1970      MRN: [id number]  Encounter Provider: Velasquez Molina MD  Encounter Date: 2025   Encounter department: [facility]    Assessment & Plan  Palmoplantar psoriasis    Orders:  •  predniSONE 10 mg tablet; Take 5 tablets for 2 days, 4 tablets for 2 days, 3 for 2 days, 2 for 2 days, 1 for 2 days  •  methotrexate 2.5 MG tablet; Take 1 tablet (2.5 mg total) by mouth every 12 hours for 3 doses only each week  •  Ambulatory Referral to Dermatology; Future       Patient Instructions   Appears to have a form of psoriasis called palmoplantar psoriasis affecting palms and soles.  Increase prednisone to 10 mg-5, 4, 3, 2, 1-2 days each, then resume 5 mg daily until the prednisone is done.  Begin methotrexate 2.5 mg 3 tablets a week.  Take it 1 time.  Referral to dermatology for their expertise.  Recheck in 4 weeks.      History of Present Illness     HPI  Here because the rash on his hands is not getting better.  In fact, also has a significant outbreak on his feet.  Saw the foot doctor who placed him on prednisone 5 mg daily.    Review of Systems    Past Medical History[1]  Past Surgical History[1]  Social History     Social History Narrative    Lives with Larisa Dillon who is a cousin. Splits the bills.     Was with Patti since .  Ended  because of his drinking. 5-year marriage previously.    2 children from first marriage.    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    Enjoys boating.     Medications[1]  No Known Allergies  Immunization History   Administered Date(s) Administered   • H1N1, All Formulations 2010     Objective   /94 (BP Location: Left arm, Patient Position: Sitting, Cuff Size: Standard)   Temp 98.7 °F (37.1 °C) (Temporal)   Resp 18   Ht 5' 8.11\" (1.73 m)   Wt 82.2 kg (181 lb 3.2 oz)   BMI 27.46 kg/m²     Physical Exam                           [1]  Past Medical History:  Diagnosis Date   • Alcohol addiction (HCC) 2015 "   • Anxiety 12/14/2022   • Nicotine use disorder 12/14/2022   • Recurrent major depressive disorder, in partial remission (HCC) 12/14/2022   [1]  No past surgical history on file.[1]  Current Outpatient Medications on File Prior to Visit   Medication Sig   • albuterol (PROVENTIL HFA,VENTOLIN HFA) 90 mcg/act inhaler Inhale 2 puffs every 4 (four) hours as needed for wheezing or shortness of breath   • busPIRone (BUSPAR) 30 MG tablet Take 1 tablet (30 mg total) by mouth 2 (two) times a day   • clobetasol (TEMOVATE) 0.05 % cream Apply topically 2 (two) times a day   • escitalopram (LEXAPRO) 10 mg tablet Take 1 tablet (10 mg total) by mouth daily   • predniSONE 5 mg tablet

## 2025-07-23 NOTE — TELEPHONE ENCOUNTER
Called and left message for patient to return call to office ASAP regarding opening for tomorrow.    ON HOLD: 7/24/2025 @ 1:00 pm with Dr. Rust in [place].    Phone number left in message.    Informed patient that appt may/may not be available if/when he returns call.

## 2025-07-23 NOTE — TELEPHONE ENCOUNTER
Patient called for a NP APT/ASAP referral  Offered tomorrow in [facility] patient advised he can go only to [facility] or [facility].  Advised Salima will call him back when she has an opening and to allow us sometimes to try to find an opening.

## 2025-07-23 NOTE — PATIENT INSTRUCTIONS
Appears to have a form of psoriasis called palmoplantar psoriasis affecting palms and soles.  Increase prednisone to 10 mg-5, 4, 3, 2, 1-2 days each, then resume 5 mg daily until the prednisone is done.  Begin methotrexate 2.5 mg 3 tablets a week.  Take it 1 time.  Referral to dermatology for their expertise.  Recheck in 4 weeks.

## 2025-07-25 NOTE — TELEPHONE ENCOUNTER
Called and spoke to patient.    Scheduled CONSULTATION for ASAP REF = PALMOPLANTAR PSORIASIS on 7/30/2025 @ 12:40 pm with Dr. Monaco in [place].    Patient aware of office location.  Patient aware to arrive 15 minutes prior.  Confirmed [employer] coverage.    Updated and attached referral.  Confirmed appointment.    Patient will probably request any follow up appts to be scheduled at [place] or [place].    Podiatry note under Media.

## 2025-07-25 NOTE — TELEPHONE ENCOUNTER
Pt returning our call, advised appt we had on hold for him has passed    Advised I can check with Salima for another appt, she is on a call with another pt, will have her call him back

## 2025-07-31 ENCOUNTER — TELEPHONE (OUTPATIENT)
Dept: FAMILY MEDICINE CLINIC | Facility: CLINIC | Age: 55
End: 2025-07-31

## 2025-08-21 ENCOUNTER — TELEPHONE (OUTPATIENT)
Dept: DERMATOLOGY | Facility: CLINIC | Age: 55
End: 2025-08-21

## 2025-08-26 PROBLEM — Q82.8 PALMOPLANTAR KERATODERMA: Status: ACTIVE | Noted: 2025-08-26
